# Patient Record
Sex: FEMALE | Race: WHITE | NOT HISPANIC OR LATINO | Employment: UNEMPLOYED | URBAN - METROPOLITAN AREA
[De-identification: names, ages, dates, MRNs, and addresses within clinical notes are randomized per-mention and may not be internally consistent; named-entity substitution may affect disease eponyms.]

---

## 2021-11-03 RX ORDER — ACETAMINOPHEN 325 MG/1
650 TABLET ORAL EVERY 6 HOURS PRN
Status: ACTIVE | OUTPATIENT
Start: 2021-11-03 | End: 2021-11-04

## 2021-11-03 RX ORDER — ONDANSETRON 2 MG/ML
4 INJECTION INTRAMUSCULAR; INTRAVENOUS EVERY 4 HOURS PRN
Status: ACTIVE | OUTPATIENT
Start: 2021-11-03 | End: 2021-11-04

## 2021-11-03 RX ORDER — CLONIDINE HYDROCHLORIDE 0.1 MG/1
0.1 TABLET ORAL EVERY 6 HOURS PRN
Status: ACTIVE | OUTPATIENT
Start: 2021-11-03 | End: 2021-11-04

## 2021-11-03 RX ORDER — ENALAPRILAT 1.25 MG/ML
1.25 INJECTION INTRAVENOUS EVERY 6 HOURS PRN
Status: ACTIVE | OUTPATIENT
Start: 2021-11-03 | End: 2021-11-04

## 2021-11-04 ENCOUNTER — HOSPITAL ENCOUNTER (INPATIENT)
Facility: MEDICAL CENTER | Age: 36
LOS: 3 days | DRG: 281 | End: 2021-11-07
Attending: EMERGENCY MEDICINE | Admitting: INTERNAL MEDICINE
Payer: COMMERCIAL

## 2021-11-04 ENCOUNTER — HOSPITAL ENCOUNTER (OUTPATIENT)
Facility: MEDICAL CENTER | Age: 36
End: 2021-11-04
Attending: STUDENT IN AN ORGANIZED HEALTH CARE EDUCATION/TRAINING PROGRAM | Admitting: STUDENT IN AN ORGANIZED HEALTH CARE EDUCATION/TRAINING PROGRAM
Payer: COMMERCIAL

## 2021-11-04 ENCOUNTER — APPOINTMENT (OUTPATIENT)
Dept: RADIOLOGY | Facility: MEDICAL CENTER | Age: 36
DRG: 281 | End: 2021-11-04
Attending: EMERGENCY MEDICINE
Payer: COMMERCIAL

## 2021-11-04 VITALS
HEART RATE: 86 BPM | SYSTOLIC BLOOD PRESSURE: 125 MMHG | RESPIRATION RATE: 16 BRPM | WEIGHT: 156.09 LBS | DIASTOLIC BLOOD PRESSURE: 92 MMHG | OXYGEN SATURATION: 98 % | TEMPERATURE: 98.1 F

## 2021-11-04 DIAGNOSIS — D50.9 IRON DEFICIENCY ANEMIA, UNSPECIFIED IRON DEFICIENCY ANEMIA TYPE: ICD-10-CM

## 2021-11-04 DIAGNOSIS — R07.9 CHEST PAIN, UNSPECIFIED TYPE: ICD-10-CM

## 2021-11-04 DIAGNOSIS — I10 HTN (HYPERTENSION), MALIGNANT: ICD-10-CM

## 2021-11-04 DIAGNOSIS — I21.4 NSTEMI (NON-ST ELEVATED MYOCARDIAL INFARCTION) (HCC): ICD-10-CM

## 2021-11-04 DIAGNOSIS — F10.10 ALCOHOL ABUSE: ICD-10-CM

## 2021-11-04 DIAGNOSIS — F15.10 METHAMPHETAMINE ABUSE (HCC): ICD-10-CM

## 2021-11-04 PROBLEM — I16.1 HYPERTENSIVE EMERGENCY: Status: ACTIVE | Noted: 2021-11-04

## 2021-11-04 LAB
ALBUMIN SERPL BCP-MCNC: 4.6 G/DL (ref 3.2–4.9)
ALBUMIN/GLOB SERPL: 1.3 G/DL
ALP SERPL-CCNC: 125 U/L (ref 30–99)
ALT SERPL-CCNC: 31 U/L (ref 2–50)
AMPHET UR QL SCN: POSITIVE
ANION GAP SERPL CALC-SCNC: 13 MMOL/L (ref 7–16)
ANION GAP SERPL CALC-SCNC: 14 MMOL/L (ref 7–16)
ANISOCYTOSIS BLD QL SMEAR: ABNORMAL
AST SERPL-CCNC: 118 U/L (ref 12–45)
B-HCG SERPL-ACNC: <1 MIU/ML (ref 0–5)
BARBITURATES UR QL SCN: NEGATIVE
BASOPHILS # BLD AUTO: 0 % (ref 0–1.8)
BASOPHILS # BLD AUTO: 0.2 % (ref 0–1.8)
BASOPHILS # BLD: 0 K/UL (ref 0–0.12)
BASOPHILS # BLD: 0.01 K/UL (ref 0–0.12)
BENZODIAZ UR QL SCN: NEGATIVE
BILIRUB SERPL-MCNC: 0.5 MG/DL (ref 0.1–1.5)
BUN SERPL-MCNC: 10 MG/DL (ref 8–22)
BUN SERPL-MCNC: 10 MG/DL (ref 8–22)
BZE UR QL SCN: NEGATIVE
CALCIUM SERPL-MCNC: 8.8 MG/DL (ref 8.5–10.5)
CALCIUM SERPL-MCNC: 9.8 MG/DL (ref 8.5–10.5)
CANNABINOIDS UR QL SCN: POSITIVE
CHLORIDE SERPL-SCNC: 101 MMOL/L (ref 96–112)
CHLORIDE SERPL-SCNC: 103 MMOL/L (ref 96–112)
CK SERPL-CCNC: 1499 U/L (ref 0–154)
CK SERPL-CCNC: 698 U/L (ref 0–154)
CO2 SERPL-SCNC: 19 MMOL/L (ref 20–33)
CO2 SERPL-SCNC: 23 MMOL/L (ref 20–33)
COMMENT 1642: NORMAL
CREAT SERPL-MCNC: 0.5 MG/DL (ref 0.5–1.4)
CREAT SERPL-MCNC: 0.57 MG/DL (ref 0.5–1.4)
D DIMER PPP IA.FEU-MCNC: 0.32 UG/ML (FEU) (ref 0–0.5)
EKG IMPRESSION: NORMAL
EOSINOPHIL # BLD AUTO: 0 K/UL (ref 0–0.51)
EOSINOPHIL # BLD AUTO: 0.01 K/UL (ref 0–0.51)
EOSINOPHIL NFR BLD: 0 % (ref 0–6.9)
EOSINOPHIL NFR BLD: 0.2 % (ref 0–6.9)
ERYTHROCYTE [DISTWIDTH] IN BLOOD BY AUTOMATED COUNT: 49 FL (ref 35.9–50)
ERYTHROCYTE [DISTWIDTH] IN BLOOD BY AUTOMATED COUNT: 49.1 FL (ref 35.9–50)
GLOBULIN SER CALC-MCNC: 3.6 G/DL (ref 1.9–3.5)
GLUCOSE SERPL-MCNC: 95 MG/DL (ref 65–99)
GLUCOSE SERPL-MCNC: 98 MG/DL (ref 65–99)
HCT VFR BLD AUTO: 34 % (ref 37–47)
HCT VFR BLD AUTO: 36.4 % (ref 37–47)
HGB BLD-MCNC: 10.2 G/DL (ref 12–16)
HGB BLD-MCNC: 10.9 G/DL (ref 12–16)
HYPOCHROMIA BLD QL SMEAR: ABNORMAL
IMM GRANULOCYTES # BLD AUTO: 0.02 K/UL (ref 0–0.11)
IMM GRANULOCYTES # BLD AUTO: 0.04 K/UL (ref 0–0.11)
IMM GRANULOCYTES NFR BLD AUTO: 0.4 % (ref 0–0.9)
IMM GRANULOCYTES NFR BLD AUTO: 0.7 % (ref 0–0.9)
LYMPHOCYTES # BLD AUTO: 0.57 K/UL (ref 1–4.8)
LYMPHOCYTES # BLD AUTO: 1.1 K/UL (ref 1–4.8)
LYMPHOCYTES NFR BLD: 10.1 % (ref 22–41)
LYMPHOCYTES NFR BLD: 19.5 % (ref 22–41)
MAGNESIUM SERPL-MCNC: 1.8 MG/DL (ref 1.5–2.5)
MCH RBC QN AUTO: 24.3 PG (ref 27–33)
MCH RBC QN AUTO: 24.4 PG (ref 27–33)
MCHC RBC AUTO-ENTMCNC: 29.9 G/DL (ref 33.6–35)
MCHC RBC AUTO-ENTMCNC: 30 G/DL (ref 33.6–35)
MCV RBC AUTO: 81.1 FL (ref 81.4–97.8)
MCV RBC AUTO: 81.6 FL (ref 81.4–97.8)
METHADONE UR QL SCN: NEGATIVE
MICROCYTES BLD QL SMEAR: ABNORMAL
MONOCYTES # BLD AUTO: 0.72 K/UL (ref 0–0.85)
MONOCYTES # BLD AUTO: 0.76 K/UL (ref 0–0.85)
MONOCYTES NFR BLD AUTO: 12.8 % (ref 0–13.4)
MONOCYTES NFR BLD AUTO: 13.5 % (ref 0–13.4)
MORPHOLOGY BLD-IMP: NORMAL
NEUTROPHILS # BLD AUTO: 3.77 K/UL (ref 2–7.15)
NEUTROPHILS # BLD AUTO: 4.28 K/UL (ref 2–7.15)
NEUTROPHILS NFR BLD: 66.9 % (ref 44–72)
NEUTROPHILS NFR BLD: 75.7 % (ref 44–72)
NRBC # BLD AUTO: 0 K/UL
NRBC # BLD AUTO: 0 K/UL
NRBC BLD-RTO: 0 /100 WBC
NRBC BLD-RTO: 0 /100 WBC
OPIATES UR QL SCN: NEGATIVE
OVALOCYTES BLD QL SMEAR: NORMAL
OXYCODONE UR QL SCN: NEGATIVE
PCP UR QL SCN: NEGATIVE
PLATELET # BLD AUTO: 257 K/UL (ref 164–446)
PLATELET # BLD AUTO: 264 K/UL (ref 164–446)
PLATELET BLD QL SMEAR: NORMAL
PMV BLD AUTO: 10.7 FL (ref 9–12.9)
PMV BLD AUTO: 12.6 FL (ref 9–12.9)
POIKILOCYTOSIS BLD QL SMEAR: NORMAL
POTASSIUM SERPL-SCNC: 3.4 MMOL/L (ref 3.6–5.5)
POTASSIUM SERPL-SCNC: 3.8 MMOL/L (ref 3.6–5.5)
PROPOXYPH UR QL SCN: NEGATIVE
PROT SERPL-MCNC: 8.2 G/DL (ref 6–8.2)
RBC # BLD AUTO: 4.19 M/UL (ref 4.2–5.4)
RBC # BLD AUTO: 4.46 M/UL (ref 4.2–5.4)
RBC BLD AUTO: PRESENT
SODIUM SERPL-SCNC: 136 MMOL/L (ref 135–145)
SODIUM SERPL-SCNC: 137 MMOL/L (ref 135–145)
TROPONIN T SERPL-MCNC: 1334 NG/L (ref 6–19)
TROPONIN T SERPL-MCNC: 1335 NG/L (ref 6–19)
TROPONIN T SERPL-MCNC: 1337 NG/L (ref 6–19)
TROPONIN T SERPL-MCNC: 1410 NG/L (ref 6–19)
TROPONIN T SERPL-MCNC: 1499 NG/L (ref 6–19)
TROPONIN T SERPL-MCNC: 996 NG/L (ref 6–19)
WBC # BLD AUTO: 5.6 K/UL (ref 4.8–10.8)
WBC # BLD AUTO: 5.7 K/UL (ref 4.8–10.8)

## 2021-11-04 PROCEDURE — A9270 NON-COVERED ITEM OR SERVICE: HCPCS | Performed by: INTERNAL MEDICINE

## 2021-11-04 PROCEDURE — 700102 HCHG RX REV CODE 250 W/ 637 OVERRIDE(OP): Performed by: INTERNAL MEDICINE

## 2021-11-04 PROCEDURE — 82550 ASSAY OF CK (CPK): CPT | Mod: 91

## 2021-11-04 PROCEDURE — 700105 HCHG RX REV CODE 258: Performed by: INTERNAL MEDICINE

## 2021-11-04 PROCEDURE — 83735 ASSAY OF MAGNESIUM: CPT

## 2021-11-04 PROCEDURE — 85025 COMPLETE CBC W/AUTO DIFF WBC: CPT | Mod: 91

## 2021-11-04 PROCEDURE — 99291 CRITICAL CARE FIRST HOUR: CPT

## 2021-11-04 PROCEDURE — G0378 HOSPITAL OBSERVATION PER HR: HCPCS

## 2021-11-04 PROCEDURE — 93005 ELECTROCARDIOGRAM TRACING: CPT | Performed by: EMERGENCY MEDICINE

## 2021-11-04 PROCEDURE — 99223 1ST HOSP IP/OBS HIGH 75: CPT | Performed by: STUDENT IN AN ORGANIZED HEALTH CARE EDUCATION/TRAINING PROGRAM

## 2021-11-04 PROCEDURE — 36415 COLL VENOUS BLD VENIPUNCTURE: CPT

## 2021-11-04 PROCEDURE — 93005 ELECTROCARDIOGRAM TRACING: CPT | Performed by: STUDENT IN AN ORGANIZED HEALTH CARE EDUCATION/TRAINING PROGRAM

## 2021-11-04 PROCEDURE — 85379 FIBRIN DEGRADATION QUANT: CPT

## 2021-11-04 PROCEDURE — 80061 LIPID PANEL: CPT

## 2021-11-04 PROCEDURE — 80307 DRUG TEST PRSMV CHEM ANLYZR: CPT

## 2021-11-04 PROCEDURE — 93010 ELECTROCARDIOGRAM REPORT: CPT | Performed by: INTERNAL MEDICINE

## 2021-11-04 PROCEDURE — 96374 THER/PROPH/DIAG INJ IV PUSH: CPT

## 2021-11-04 PROCEDURE — 80048 BASIC METABOLIC PNL TOTAL CA: CPT

## 2021-11-04 PROCEDURE — 700102 HCHG RX REV CODE 250 W/ 637 OVERRIDE(OP): Performed by: STUDENT IN AN ORGANIZED HEALTH CARE EDUCATION/TRAINING PROGRAM

## 2021-11-04 PROCEDURE — 96376 TX/PRO/DX INJ SAME DRUG ADON: CPT

## 2021-11-04 PROCEDURE — 700111 HCHG RX REV CODE 636 W/ 250 OVERRIDE (IP): Performed by: STUDENT IN AN ORGANIZED HEALTH CARE EDUCATION/TRAINING PROGRAM

## 2021-11-04 PROCEDURE — 71045 X-RAY EXAM CHEST 1 VIEW: CPT

## 2021-11-04 PROCEDURE — 84702 CHORIONIC GONADOTROPIN TEST: CPT

## 2021-11-04 PROCEDURE — 80053 COMPREHEN METABOLIC PANEL: CPT

## 2021-11-04 PROCEDURE — 85025 COMPLETE CBC W/AUTO DIFF WBC: CPT

## 2021-11-04 PROCEDURE — 770020 HCHG ROOM/CARE - TELE (206)

## 2021-11-04 PROCEDURE — 93010 ELECTROCARDIOGRAM REPORT: CPT | Mod: 76 | Performed by: INTERNAL MEDICINE

## 2021-11-04 PROCEDURE — 700111 HCHG RX REV CODE 636 W/ 250 OVERRIDE (IP): Performed by: EMERGENCY MEDICINE

## 2021-11-04 PROCEDURE — 80048 BASIC METABOLIC PNL TOTAL CA: CPT | Mod: 91

## 2021-11-04 PROCEDURE — 84484 ASSAY OF TROPONIN QUANT: CPT | Mod: 91

## 2021-11-04 PROCEDURE — 96372 THER/PROPH/DIAG INJ SC/IM: CPT

## 2021-11-04 PROCEDURE — 99223 1ST HOSP IP/OBS HIGH 75: CPT | Performed by: INTERNAL MEDICINE

## 2021-11-04 PROCEDURE — A9270 NON-COVERED ITEM OR SERVICE: HCPCS | Performed by: STUDENT IN AN ORGANIZED HEALTH CARE EDUCATION/TRAINING PROGRAM

## 2021-11-04 PROCEDURE — 700105 HCHG RX REV CODE 258: Performed by: STUDENT IN AN ORGANIZED HEALTH CARE EDUCATION/TRAINING PROGRAM

## 2021-11-04 PROCEDURE — 700111 HCHG RX REV CODE 636 W/ 250 OVERRIDE (IP): Performed by: INTERNAL MEDICINE

## 2021-11-04 PROCEDURE — 93005 ELECTROCARDIOGRAM TRACING: CPT

## 2021-11-04 PROCEDURE — 82550 ASSAY OF CK (CPK): CPT

## 2021-11-04 PROCEDURE — 96375 TX/PRO/DX INJ NEW DRUG ADDON: CPT

## 2021-11-04 RX ORDER — HYDROMORPHONE HYDROCHLORIDE 1 MG/ML
0.5 INJECTION, SOLUTION INTRAMUSCULAR; INTRAVENOUS; SUBCUTANEOUS
Status: DISCONTINUED | OUTPATIENT
Start: 2021-11-04 | End: 2021-11-07 | Stop reason: HOSPADM

## 2021-11-04 RX ORDER — BISACODYL 10 MG
10 SUPPOSITORY, RECTAL RECTAL
Status: DISCONTINUED | OUTPATIENT
Start: 2021-11-04 | End: 2021-11-07 | Stop reason: HOSPADM

## 2021-11-04 RX ORDER — ATORVASTATIN CALCIUM 10 MG/1
10 TABLET, FILM COATED ORAL EVERY EVENING
Status: DISCONTINUED | OUTPATIENT
Start: 2021-11-04 | End: 2021-11-06

## 2021-11-04 RX ORDER — CLONIDINE HYDROCHLORIDE 0.1 MG/1
0.1 TABLET ORAL EVERY 6 HOURS PRN
Status: DISCONTINUED | OUTPATIENT
Start: 2021-11-04 | End: 2021-11-07 | Stop reason: HOSPADM

## 2021-11-04 RX ORDER — PROCHLORPERAZINE EDISYLATE 5 MG/ML
5-10 INJECTION INTRAMUSCULAR; INTRAVENOUS EVERY 4 HOURS PRN
Status: DISCONTINUED | OUTPATIENT
Start: 2021-11-04 | End: 2021-11-07 | Stop reason: HOSPADM

## 2021-11-04 RX ORDER — ASPIRIN 300 MG/1
300 SUPPOSITORY RECTAL DAILY
Status: DISCONTINUED | OUTPATIENT
Start: 2021-11-05 | End: 2021-11-06

## 2021-11-04 RX ORDER — AMLODIPINE BESYLATE 5 MG/1
5 TABLET ORAL
Status: DISCONTINUED | OUTPATIENT
Start: 2021-11-05 | End: 2021-11-06

## 2021-11-04 RX ORDER — LORAZEPAM 2 MG/1
4 TABLET ORAL
Status: DISCONTINUED | OUTPATIENT
Start: 2021-11-04 | End: 2021-11-07 | Stop reason: HOSPADM

## 2021-11-04 RX ORDER — ATORVASTATIN CALCIUM 10 MG/1
10 TABLET, FILM COATED ORAL EVERY EVENING
Status: DISCONTINUED | OUTPATIENT
Start: 2021-11-04 | End: 2021-11-04 | Stop reason: HOSPADM

## 2021-11-04 RX ORDER — LORAZEPAM 0.5 MG/1
0.5 TABLET ORAL EVERY 4 HOURS PRN
Status: DISCONTINUED | OUTPATIENT
Start: 2021-11-04 | End: 2021-11-07 | Stop reason: HOSPADM

## 2021-11-04 RX ORDER — MORPHINE SULFATE 4 MG/ML
4 INJECTION, SOLUTION INTRAMUSCULAR; INTRAVENOUS ONCE
Status: COMPLETED | OUTPATIENT
Start: 2021-11-04 | End: 2021-11-04

## 2021-11-04 RX ORDER — LORAZEPAM 2 MG/ML
0.5 INJECTION INTRAMUSCULAR EVERY 4 HOURS PRN
Status: DISCONTINUED | OUTPATIENT
Start: 2021-11-04 | End: 2021-11-07 | Stop reason: HOSPADM

## 2021-11-04 RX ORDER — PROMETHAZINE HYDROCHLORIDE 25 MG/1
12.5-25 TABLET ORAL EVERY 4 HOURS PRN
Status: DISCONTINUED | OUTPATIENT
Start: 2021-11-04 | End: 2021-11-07 | Stop reason: HOSPADM

## 2021-11-04 RX ORDER — ASPIRIN 325 MG
325 TABLET ORAL DAILY
Status: DISCONTINUED | OUTPATIENT
Start: 2021-11-05 | End: 2021-11-06

## 2021-11-04 RX ORDER — NITROGLYCERIN 0.4 MG/1
0.4 TABLET SUBLINGUAL
Status: DISCONTINUED | OUTPATIENT
Start: 2021-11-04 | End: 2021-11-07 | Stop reason: HOSPADM

## 2021-11-04 RX ORDER — LORAZEPAM 2 MG/ML
0.5 INJECTION INTRAMUSCULAR EVERY 4 HOURS PRN
Status: DISCONTINUED | OUTPATIENT
Start: 2021-11-04 | End: 2021-11-04 | Stop reason: HOSPADM

## 2021-11-04 RX ORDER — ASPIRIN 81 MG/1
81 TABLET, CHEWABLE ORAL DAILY
Status: DISCONTINUED | OUTPATIENT
Start: 2021-11-05 | End: 2021-11-04

## 2021-11-04 RX ORDER — METOPROLOL TARTRATE 50 MG/1
50 TABLET, FILM COATED ORAL TWICE DAILY
Status: DISCONTINUED | OUTPATIENT
Start: 2021-11-04 | End: 2021-11-07 | Stop reason: HOSPADM

## 2021-11-04 RX ORDER — HEPARIN SODIUM 5000 [USP'U]/100ML
0-30 INJECTION, SOLUTION INTRAVENOUS CONTINUOUS
Status: DISCONTINUED | OUTPATIENT
Start: 2021-11-04 | End: 2021-11-04

## 2021-11-04 RX ORDER — HEPARIN SODIUM 1000 [USP'U]/ML
4000 INJECTION, SOLUTION INTRAVENOUS; SUBCUTANEOUS ONCE
Status: DISCONTINUED | OUTPATIENT
Start: 2021-11-04 | End: 2021-11-04

## 2021-11-04 RX ORDER — LABETALOL HYDROCHLORIDE 5 MG/ML
10 INJECTION, SOLUTION INTRAVENOUS EVERY 4 HOURS PRN
Status: DISCONTINUED | OUTPATIENT
Start: 2021-11-04 | End: 2021-11-07 | Stop reason: HOSPADM

## 2021-11-04 RX ORDER — ASPIRIN 81 MG/1
81 TABLET, CHEWABLE ORAL DAILY
Status: DISCONTINUED | OUTPATIENT
Start: 2021-11-04 | End: 2021-11-04 | Stop reason: HOSPADM

## 2021-11-04 RX ORDER — HEPARIN SODIUM 1000 [USP'U]/ML
2000 INJECTION, SOLUTION INTRAVENOUS; SUBCUTANEOUS PRN
Status: DISCONTINUED | OUTPATIENT
Start: 2021-11-04 | End: 2021-11-04

## 2021-11-04 RX ORDER — OXYCODONE HYDROCHLORIDE 5 MG/1
5 TABLET ORAL
Status: DISCONTINUED | OUTPATIENT
Start: 2021-11-04 | End: 2021-11-07 | Stop reason: HOSPADM

## 2021-11-04 RX ORDER — NICOTINE 21 MG/24HR
1 PATCH, TRANSDERMAL 24 HOURS TRANSDERMAL ONCE
Status: ACTIVE | OUTPATIENT
Start: 2021-11-04 | End: 2021-11-05

## 2021-11-04 RX ORDER — LORAZEPAM 2 MG/ML
2 INJECTION INTRAMUSCULAR
Status: DISCONTINUED | OUTPATIENT
Start: 2021-11-04 | End: 2021-11-04 | Stop reason: HOSPADM

## 2021-11-04 RX ORDER — ASPIRIN 81 MG/1
324 TABLET, CHEWABLE ORAL DAILY
Status: DISCONTINUED | OUTPATIENT
Start: 2021-11-05 | End: 2021-11-06

## 2021-11-04 RX ORDER — NITROGLYCERIN 0.4 MG/1
0.4 TABLET SUBLINGUAL
Status: DISCONTINUED | OUTPATIENT
Start: 2021-11-04 | End: 2021-11-04 | Stop reason: HOSPADM

## 2021-11-04 RX ORDER — LORAZEPAM 2 MG/ML
1.5 INJECTION INTRAMUSCULAR
Status: DISCONTINUED | OUTPATIENT
Start: 2021-11-04 | End: 2021-11-07 | Stop reason: HOSPADM

## 2021-11-04 RX ORDER — LORAZEPAM 1 MG/1
1 TABLET ORAL EVERY 4 HOURS PRN
Status: DISCONTINUED | OUTPATIENT
Start: 2021-11-04 | End: 2021-11-07 | Stop reason: HOSPADM

## 2021-11-04 RX ORDER — METOPROLOL TARTRATE 50 MG/1
50 TABLET, FILM COATED ORAL TWICE DAILY
Status: DISCONTINUED | OUTPATIENT
Start: 2021-11-04 | End: 2021-11-04 | Stop reason: HOSPADM

## 2021-11-04 RX ORDER — LORAZEPAM 2 MG/1
4 TABLET ORAL
Status: DISCONTINUED | OUTPATIENT
Start: 2021-11-04 | End: 2021-11-04 | Stop reason: HOSPADM

## 2021-11-04 RX ORDER — LORAZEPAM 2 MG/ML
1 INJECTION INTRAMUSCULAR
Status: DISCONTINUED | OUTPATIENT
Start: 2021-11-04 | End: 2021-11-04 | Stop reason: HOSPADM

## 2021-11-04 RX ORDER — AMLODIPINE BESYLATE 5 MG/1
5 TABLET ORAL
Status: DISCONTINUED | OUTPATIENT
Start: 2021-11-04 | End: 2021-11-04 | Stop reason: HOSPADM

## 2021-11-04 RX ORDER — PROMETHAZINE HYDROCHLORIDE 25 MG/1
12.5-25 SUPPOSITORY RECTAL EVERY 4 HOURS PRN
Status: DISCONTINUED | OUTPATIENT
Start: 2021-11-04 | End: 2021-11-07 | Stop reason: HOSPADM

## 2021-11-04 RX ORDER — LORAZEPAM 2 MG/ML
1.5 INJECTION INTRAMUSCULAR
Status: DISCONTINUED | OUTPATIENT
Start: 2021-11-04 | End: 2021-11-04 | Stop reason: HOSPADM

## 2021-11-04 RX ORDER — LORAZEPAM 2 MG/1
2 TABLET ORAL
Status: DISCONTINUED | OUTPATIENT
Start: 2021-11-04 | End: 2021-11-07 | Stop reason: HOSPADM

## 2021-11-04 RX ORDER — AMOXICILLIN 250 MG
2 CAPSULE ORAL 2 TIMES DAILY
Status: DISCONTINUED | OUTPATIENT
Start: 2021-11-05 | End: 2021-11-07 | Stop reason: HOSPADM

## 2021-11-04 RX ORDER — LORAZEPAM 2 MG/1
2 TABLET ORAL
Status: DISCONTINUED | OUTPATIENT
Start: 2021-11-04 | End: 2021-11-04 | Stop reason: HOSPADM

## 2021-11-04 RX ORDER — LORAZEPAM 2 MG/ML
2 INJECTION INTRAMUSCULAR
Status: DISCONTINUED | OUTPATIENT
Start: 2021-11-04 | End: 2021-11-07 | Stop reason: HOSPADM

## 2021-11-04 RX ORDER — SODIUM CHLORIDE 9 MG/ML
INJECTION, SOLUTION INTRAVENOUS CONTINUOUS
Status: DISCONTINUED | OUTPATIENT
Start: 2021-11-04 | End: 2021-11-04 | Stop reason: HOSPADM

## 2021-11-04 RX ORDER — ONDANSETRON 4 MG/1
4 TABLET, ORALLY DISINTEGRATING ORAL EVERY 4 HOURS PRN
Status: DISCONTINUED | OUTPATIENT
Start: 2021-11-04 | End: 2021-11-07 | Stop reason: HOSPADM

## 2021-11-04 RX ORDER — OXYCODONE HYDROCHLORIDE 10 MG/1
10 TABLET ORAL
Status: DISCONTINUED | OUTPATIENT
Start: 2021-11-04 | End: 2021-11-07 | Stop reason: HOSPADM

## 2021-11-04 RX ORDER — LORAZEPAM 2 MG/ML
1 INJECTION INTRAMUSCULAR
Status: DISCONTINUED | OUTPATIENT
Start: 2021-11-04 | End: 2021-11-07 | Stop reason: HOSPADM

## 2021-11-04 RX ORDER — LORAZEPAM 1 MG/1
1 TABLET ORAL EVERY 4 HOURS PRN
Status: DISCONTINUED | OUTPATIENT
Start: 2021-11-04 | End: 2021-11-04 | Stop reason: HOSPADM

## 2021-11-04 RX ORDER — ENALAPRILAT 1.25 MG/ML
1.25 INJECTION INTRAVENOUS EVERY 6 HOURS PRN
Status: DISCONTINUED | OUTPATIENT
Start: 2021-11-04 | End: 2021-11-07 | Stop reason: HOSPADM

## 2021-11-04 RX ORDER — LORAZEPAM 1 MG/1
1 TABLET ORAL 3 TIMES DAILY PRN
Status: ON HOLD | COMMUNITY
End: 2021-11-06

## 2021-11-04 RX ORDER — POLYETHYLENE GLYCOL 3350 17 G/17G
1 POWDER, FOR SOLUTION ORAL
Status: DISCONTINUED | OUTPATIENT
Start: 2021-11-04 | End: 2021-11-07 | Stop reason: HOSPADM

## 2021-11-04 RX ORDER — HEPARIN SODIUM 5000 [USP'U]/ML
5000 INJECTION, SOLUTION INTRAVENOUS; SUBCUTANEOUS EVERY 8 HOURS
Status: DISCONTINUED | OUTPATIENT
Start: 2021-11-04 | End: 2021-11-05

## 2021-11-04 RX ORDER — ONDANSETRON 2 MG/ML
4 INJECTION INTRAMUSCULAR; INTRAVENOUS EVERY 4 HOURS PRN
Status: DISCONTINUED | OUTPATIENT
Start: 2021-11-04 | End: 2021-11-07 | Stop reason: HOSPADM

## 2021-11-04 RX ORDER — NITROGLYCERIN 0.4 MG/1
0.4 TABLET SUBLINGUAL
Status: DISCONTINUED | OUTPATIENT
Start: 2021-11-04 | End: 2021-11-04

## 2021-11-04 RX ORDER — LORAZEPAM 0.5 MG/1
0.5 TABLET ORAL EVERY 4 HOURS PRN
Status: DISCONTINUED | OUTPATIENT
Start: 2021-11-04 | End: 2021-11-04 | Stop reason: HOSPADM

## 2021-11-04 RX ORDER — POTASSIUM CHLORIDE 20 MEQ/1
20 TABLET, EXTENDED RELEASE ORAL ONCE
Status: COMPLETED | OUTPATIENT
Start: 2021-11-04 | End: 2021-11-04

## 2021-11-04 RX ADMIN — HEPARIN SODIUM 5000 UNITS: 5000 INJECTION, SOLUTION INTRAVENOUS; SUBCUTANEOUS at 22:08

## 2021-11-04 RX ADMIN — LORAZEPAM 3 MG: 2 TABLET ORAL at 01:33

## 2021-11-04 RX ADMIN — ATORVASTATIN CALCIUM 10 MG: 10 TABLET, FILM COATED ORAL at 19:07

## 2021-11-04 RX ADMIN — MORPHINE SULFATE 4 MG: 4 INJECTION INTRAVENOUS at 18:05

## 2021-11-04 RX ADMIN — SODIUM CHLORIDE: 9 INJECTION, SOLUTION INTRAVENOUS at 06:02

## 2021-11-04 RX ADMIN — AMLODIPINE BESYLATE 5 MG: 5 TABLET ORAL at 12:28

## 2021-11-04 RX ADMIN — LORAZEPAM 1.5 MG: 2 INJECTION INTRAMUSCULAR; INTRAVENOUS at 02:55

## 2021-11-04 RX ADMIN — POTASSIUM CHLORIDE 20 MEQ: 1500 TABLET, EXTENDED RELEASE ORAL at 12:28

## 2021-11-04 RX ADMIN — HYDROMORPHONE HYDROCHLORIDE 0.5 MG: 1 INJECTION, SOLUTION INTRAMUSCULAR; INTRAVENOUS; SUBCUTANEOUS at 22:08

## 2021-11-04 RX ADMIN — ASPIRIN 81 MG: 81 TABLET, CHEWABLE ORAL at 06:03

## 2021-11-04 RX ADMIN — LORAZEPAM 0.5 MG: 2 INJECTION INTRAMUSCULAR; INTRAVENOUS at 12:29

## 2021-11-04 RX ADMIN — METOPROLOL TARTRATE 50 MG: 50 TABLET, FILM COATED ORAL at 19:07

## 2021-11-04 RX ADMIN — METOPROLOL TARTRATE 50 MG: 50 TABLET, FILM COATED ORAL at 09:24

## 2021-11-04 RX ADMIN — LORAZEPAM 1 MG: 1 TABLET ORAL at 08:17

## 2021-11-04 RX ADMIN — THERA TABS 1 TABLET: TAB at 06:03

## 2021-11-04 ASSESSMENT — COGNITIVE AND FUNCTIONAL STATUS - GENERAL
MOVING FROM LYING ON BACK TO SITTING ON SIDE OF FLAT BED: A LITTLE
MOBILITY SCORE: 23
DAILY ACTIVITIY SCORE: 23
SUGGESTED CMS G CODE MODIFIER MOBILITY: CI
HELP NEEDED FOR BATHING: A LITTLE
SUGGESTED CMS G CODE MODIFIER DAILY ACTIVITY: CI

## 2021-11-04 ASSESSMENT — LIFESTYLE VARIABLES
TACTILE DISTURBANCES: VERY MILD ITCHING, PINS AND NEEDLES SENSATION, BURNING OR NUMBNESS
AUDITORY DISTURBANCES: NOT PRESENT
TREMOR: TREMOR NOT VISIBLE BUT CAN BE FELT, FINGERTIP TO FINGERTIP
HOW MANY TIMES IN THE PAST YEAR HAVE YOU HAD 5 OR MORE DRINKS IN A DAY: 365
ANXIETY: MODERATELY ANXIOUS OR GUARDED, SO ANXIETY IS INFERRED
VISUAL DISTURBANCES: NOT PRESENT
NAUSEA AND VOMITING: NO NAUSEA AND NO VOMITING
AUDITORY DISTURBANCES: NOT PRESENT
ON A TYPICAL DAY WHEN YOU DRINK ALCOHOL HOW MANY DRINKS DO YOU HAVE: 5
HAVE PEOPLE ANNOYED YOU BY CRITICIZING YOUR DRINKING: NO
VISUAL DISTURBANCES: NOT PRESENT
HEADACHE, FULLNESS IN HEAD: MODERATELY SEVERE
TREMOR: MODERATE TREMOR WITH ARMS EXTENDED
NAUSEA AND VOMITING: *
AGITATION: MODERATELY FIDGETY AND RESTLESS
ALCOHOL_USE: YES
CONSUMPTION TOTAL: POSITIVE
AGITATION: NORMAL ACTIVITY
ANXIETY: MODERATELY ANXIOUS OR GUARDED, SO ANXIETY IS INFERRED
AVERAGE NUMBER OF DAYS PER WEEK YOU HAVE A DRINK CONTAINING ALCOHOL: 7
TOTAL SCORE: 10
HOW MANY TIMES IN THE PAST YEAR HAVE YOU HAD 5 OR MORE DRINKS IN A DAY: 365
AGITATION: NORMAL ACTIVITY
HAVE YOU EVER FELT YOU SHOULD CUT DOWN ON YOUR DRINKING: NO
VISUAL DISTURBANCES: NOT PRESENT
HAVE PEOPLE ANNOYED YOU BY CRITICIZING YOUR DRINKING: YES
TREMOR: NO TREMOR
HEADACHE, FULLNESS IN HEAD: MILD
TREMOR: TREMOR NOT VISIBLE BUT CAN BE FELT, FINGERTIP TO FINGERTIP
TOTAL SCORE: 4
ANXIETY: MODERATELY ANXIOUS OR GUARDED, SO ANXIETY IS INFERRED
EVER HAD A DRINK FIRST THING IN THE MORNING TO STEADY YOUR NERVES TO GET RID OF A HANGOVER: YES
ANXIETY: MILDLY ANXIOUS
ORIENTATION AND CLOUDING OF SENSORIUM: DATE DISORIENTATION BY MORE THAN TWO CALENDAR DAYS
TREMOR: NO TREMOR
AGITATION: SOMEWHAT MORE THAN NORMAL ACTIVITY
HEADACHE, FULLNESS IN HEAD: MODERATELY SEVERE
EVER FELT BAD OR GUILTY ABOUT YOUR DRINKING: YES
ANXIETY: NO ANXIETY (AT EASE)
AUDITORY DISTURBANCES: NOT PRESENT
AUDITORY DISTURBANCES: NOT PRESENT
ORIENTATION AND CLOUDING OF SENSORIUM: ORIENTED AND CAN DO SERIAL ADDITIONS
VISUAL DISTURBANCES: VERY MILD SENSITIVITY
PAROXYSMAL SWEATS: NO SWEAT VISIBLE
NAUSEA AND VOMITING: MILD NAUSEA WITH NO VOMITING
TOTAL SCORE: 1
EVER HAD A DRINK FIRST THING IN THE MORNING TO STEADY YOUR NERVES TO GET RID OF A HANGOVER: YES
ORIENTATION AND CLOUDING OF SENSORIUM: ORIENTED AND CAN DO SERIAL ADDITIONS
CONSUMPTION TOTAL: POSITIVE
TOTAL SCORE: 4
TOTAL SCORE: 1
TOTAL SCORE: 4
TOTAL SCORE: 18
ORIENTATION AND CLOUDING OF SENSORIUM: ORIENTED AND CAN DO SERIAL ADDITIONS
ON A TYPICAL DAY WHEN YOU DRINK ALCOHOL HOW MANY DRINKS DO YOU HAVE: 7
PAROXYSMAL SWEATS: NO SWEAT VISIBLE
ANXIETY: MILDLY ANXIOUS
TOTAL SCORE: 5
AUDITORY DISTURBANCES: NOT PRESENT
AUDITORY DISTURBANCES: NOT PRESENT
AGITATION: MODERATELY FIDGETY AND RESTLESS
HAVE YOU EVER FELT YOU SHOULD CUT DOWN ON YOUR DRINKING: YES
TREMOR: NO TREMOR
HEADACHE, FULLNESS IN HEAD: MILD
AUDITORY DISTURBANCES: NOT PRESENT
ORIENTATION AND CLOUDING OF SENSORIUM: DATE DISORIENTATION BY MORE THAN TWO CALENDAR DAYS
VISUAL DISTURBANCES: NOT PRESENT
ORIENTATION AND CLOUDING OF SENSORIUM: DATE DISORIENTATION BY MORE THAN TWO CALENDAR DAYS
TOTAL SCORE: 13
AGITATION: NORMAL ACTIVITY
NAUSEA AND VOMITING: NO NAUSEA AND NO VOMITING
VISUAL DISTURBANCES: MILD SENSITIVITY
AVERAGE NUMBER OF DAYS PER WEEK YOU HAVE A DRINK CONTAINING ALCOHOL: 7
NAUSEA AND VOMITING: MILD NAUSEA WITH NO VOMITING
AGITATION: NORMAL ACTIVITY
PAROXYSMAL SWEATS: NO SWEAT VISIBLE
ORIENTATION AND CLOUDING OF SENSORIUM: ORIENTED AND CAN DO SERIAL ADDITIONS
TOTAL SCORE: 1
HEADACHE, FULLNESS IN HEAD: MODERATE
TOTAL SCORE: 9
NAUSEA AND VOMITING: MILD NAUSEA WITH NO VOMITING
TOTAL SCORE: MILD ITCHING, PINS AND NEEDLES SENSATION, BURNING OR NUMBNESS
HEADACHE, FULLNESS IN HEAD: EXTREMELY SEVERE
NAUSEA AND VOMITING: *
ANXIETY: MILDLY ANXIOUS
TOTAL SCORE: 4
PAROXYSMAL SWEATS: NO SWEAT VISIBLE
EVER FELT BAD OR GUILTY ABOUT YOUR DRINKING: NO
DOES PATIENT WANT TO STOP DRINKING: NO
TREMOR: TREMOR NOT VISIBLE BUT CAN BE FELT, FINGERTIP TO FINGERTIP
TOTAL SCORE: MILD ITCHING, PINS AND NEEDLES SENSATION, BURNING OR NUMBNESS
HEADACHE, FULLNESS IN HEAD: NOT PRESENT
PAROXYSMAL SWEATS: NO SWEAT VISIBLE
PAROXYSMAL SWEATS: NO SWEAT VISIBLE
HEADACHE, FULLNESS IN HEAD: MODERATE
PAROXYSMAL SWEATS: NO SWEAT VISIBLE
ANXIETY: NO ANXIETY (AT EASE)
VISUAL DISTURBANCES: NOT PRESENT
NAUSEA AND VOMITING: NO NAUSEA AND NO VOMITING
ORIENTATION AND CLOUDING OF SENSORIUM: DATE DISORIENTATION BY MORE THAN TWO CALENDAR DAYS
TOTAL SCORE: 4
AUDITORY DISTURBANCES: NOT PRESENT
AGITATION: NORMAL ACTIVITY
PAROXYSMAL SWEATS: BARELY PERCEPTIBLE SWEATING. PALMS MOIST
TOTAL SCORE: 25
DO YOU DRINK ALCOHOL: YES
TREMOR: *
VISUAL DISTURBANCES: NOT PRESENT

## 2021-11-04 ASSESSMENT — PATIENT HEALTH QUESTIONNAIRE - PHQ9
1. LITTLE INTEREST OR PLEASURE IN DOING THINGS: NOT AT ALL
2. FEELING DOWN, DEPRESSED, IRRITABLE, OR HOPELESS: NOT AT ALL
SUM OF ALL RESPONSES TO PHQ9 QUESTIONS 1 AND 2: 0

## 2021-11-04 ASSESSMENT — ENCOUNTER SYMPTOMS
INSOMNIA: 0
RESPIRATORY NEGATIVE: 1
HEADACHES: 0
NAUSEA: 0
MYALGIAS: 0
HEMOPTYSIS: 0
NECK PAIN: 0
NEUROLOGICAL NEGATIVE: 1
COUGH: 0
VOMITING: 0
WEIGHT LOSS: 0
STRIDOR: 0
PALPITATIONS: 0
DOUBLE VISION: 0
FEVER: 0
BRUISES/BLEEDS EASILY: 0
EYES NEGATIVE: 1
SORE THROAT: 0
BLURRED VISION: 0
DIZZINESS: 0
DEPRESSION: 0
PSYCHIATRIC NEGATIVE: 1
MUSCULOSKELETAL NEGATIVE: 1
GASTROINTESTINAL NEGATIVE: 1

## 2021-11-04 ASSESSMENT — PAIN DESCRIPTION - PAIN TYPE
TYPE: ACUTE PAIN

## 2021-11-04 NOTE — PROGRESS NOTES
4 Eyes Skin Assessment Completed by MALU Mueller and MALU Boyle.    Head WDL  Ears WDL  Nose WDL  Mouth WDL  Neck WDL  Breast/Chest WDL  Shoulder Blades WDL  Spine WDL  (R) Arm/Elbow/Hand WDL  (L) Arm/Elbow/Hand WDL  Abdomen WDL  Groin WDL  Scrotum/Coccyx/Buttocks WDL  (R) Leg WDL  (L) Leg WDL  (R) Heel/Foot/Toe dryness/cracking  (L) Heel/Foot/Toe dryness/cracking          Devices In Places Tele Box, Pulse Ox and Nasal Cannula      Interventions In Place Gray Ear Foams and Pillows    Possible Skin Injury No    Pictures Uploaded Into Epic N/A  Wound Consult Placed N/A  RN Wound Prevention Protocol Ordered No

## 2021-11-04 NOTE — DISCHARGE SUMMARY
"Discharge Summary    CHIEF COMPLAINT ON ADMISSION  No chief complaint on file.      Reason for Admission  NSTEMI     Admission Date  11/4/2021    CODE STATUS  Full    HPI & HOSPITAL COURSE  Patient was seen at bedside and chart was reviewed. Please see Dr. Smith's note for further details.  35 yo woman with alcohol and meth use who presented [facility] with chest pain. She had UDS and alcohol level that were negative. She had increased CPK and increasing troponin and EKG with early repolarization changes. She was given aspirin and therapeutic lovenox dose and transferred.   Troponin 1337-> 1410  I consulted cardiology, this is likely meth induced demand ischemia. She was started on cardiac medications and TTE pending.   Patient wanted to leave to smoke. She refused nicotine or any other medications because \"it's not the same.\" I explained the risks of her leaving AMA which she understood, she says she'll come right back if anything happened.    Therefore, she left AMA      Discharge Date  11/4/2021      DISCHARGE DIAGNOSES  Principal Problem:    Chest pain POA: Unknown  Active Problems:    Methamphetamine abuse (HCC) POA: Unknown    Alcohol abuse POA: Unknown    Hypertensive emergency POA: Unknown    NSTEMI (non-ST elevated myocardial infarction) ([facility]) POA: Unknown  Resolved Problems:    * No resolved hospital problems. *      FOLLOW UP  No future appointments.  No follow-up provider specified.    Allergies  Allergies   Allergen Reactions   • Bee Venom Unspecified     Unknown reaction     • Hydrocodone Unspecified     Unknown reaction         DIET  Orders Placed This Encounter   Procedures   • Diet NPO Restrict to: Sips with Medications     Standing Status:   Standing     Number of Occurrences:   1     Order Specific Question:   Diet NPO Restrict to:     Answer:   Sips with Medications [3]       LABORATORY  Lab Results   Component Value Date    SODIUM 136 11/04/2021    POTASSIUM 3.4 (L) 11/04/2021    CHLORIDE 103 " 11/04/2021    CO2 19 (L) 11/04/2021    GLUCOSE 98 11/04/2021    BUN 10 11/04/2021    CREATININE 0.50 11/04/2021        Lab Results   Component Value Date    WBC 5.6 11/04/2021    HEMOGLOBIN 10.2 (L) 11/04/2021    HEMATOCRIT 34.0 (L) 11/04/2021    PLATELETCT 264 11/04/2021

## 2021-11-04 NOTE — ASSESSMENT & PLAN NOTE
Admit patient to telemetry  Troponin at outside facility 8.37 -> 12.57 (with scale with normal <0.04) , trend troponin  EKG shows J point elevation in lateral leads   DDIMER negative at outside facility   Echocardiogram in a.m.  Cardiology consult in a.m.  Given Lovenox and full dose aspirin at outside facility  Start aspirin 81 mg p.o. daily

## 2021-11-04 NOTE — PROGRESS NOTES
Pharmacy Medication Reconciliation      ~Medication reconciliation updated and complete per pt spouse at bedside & pt home pharamcy  ~No oral ABX within the last 30 days  ~Patient home pharmacy:[facility]

## 2021-11-04 NOTE — H&P
Hospital Medicine History & Physical Note    Date of Service  11/4/2021    Primary Care Physician  No primary care provider on file.    Consultants  None      Code Status  Full Code    Chief Complaint  Chest pain     History of Presenting Illness  Jerica Espinal is a 36 y.o. female who presented 11/4/2021 with chest pain nausea vomiting throbbing headache.  She presented to outside facility with the symptoms.  At outside facility, she was found to have CPK in the 150.  She was given aspirin loading dose, nitroglycerin, and therapeutic Lovenox.  She is found to have troponin of 8.37 that trended up to 12.57 with a scale of normal that is less than 0.04.  EKG shows normal sinus rhythm with J-point elevation and early requires age on the lateral leads.  Patient was transferred to [facility] for cardiac work-up.    Patient seen at bedside, appears agitated and anxious.  States that she has had chest pain that started yesterday along with shoulder pain, throbbing headache, and feeling nauseous.  States that she drinks a pint of vodka every day, last snorted methamphetamine yesterday prior to admission.  She states that she uses methamphetamine to take the edge off the alcohol.    I discussed the plan of care with patient.    Review of Systems  Review of Systems   Constitutional: Positive for malaise/fatigue.   HENT: Negative.    Eyes: Negative.    Respiratory: Negative.    Cardiovascular: Positive for chest pain.   Gastrointestinal: Negative.    Genitourinary: Negative.    Musculoskeletal: Negative.    Skin: Negative.    Neurological: Negative.    Endo/Heme/Allergies: Negative.    Psychiatric/Behavioral: Negative.        Past Medical History   has a past medical history of Pre-eclampsia added to pre-existing hypertension.    Surgical History   has a past surgical history that includes [id number].     Family History   Family history reviewed with patient. There is no family history that is pertinent to the chief complaint.      Social History   reports that she has been smoking cigarettes. She does not have any smokeless tobacco history on file. She reports current alcohol use.    Allergies  Allergies   Allergen Reactions   • Bee Venom        Medications  None       Physical Exam  Temp:  [36.8 °C (98.2 °F)] 36.8 °C (98.2 °F)  Pulse:  [82] 82  Resp:  [20] 20  BP: (163)/(95) 163/95  SpO2:  [98 %] 98 %  Blood Pressure: (!) 163/95 (RN notified)   Temperature: 36.8 °C (98.2 °F)   Pulse: 82   Respiration: 20   Pulse Oximetry: 98 %       Physical Exam  Constitutional:       Appearance: Normal appearance. She is normal weight.      Comments: Appears agitated at bedside     HENT:      Head: Normocephalic.      Nose: Nose normal.      Mouth/Throat:      Mouth: Mucous membranes are moist.      Pharynx: Oropharynx is clear.   Eyes:      Extraocular Movements: Extraocular movements intact.      Conjunctiva/sclera: Conjunctivae normal.      Pupils: Pupils are equal, round, and reactive to light.   Cardiovascular:      Rate and Rhythm: Normal rate and regular rhythm.      Pulses: Normal pulses.   Pulmonary:      Effort: Pulmonary effort is normal.      Breath sounds: Normal breath sounds.   Abdominal:      General: Abdomen is flat. Bowel sounds are normal.      Palpations: Abdomen is soft.   Neurological:      Mental Status: She is alert.         Laboratory:          No results for input(s): ALTSGPT, ASTSGOT, ALKPHOSPHAT, TBILIRUBIN, DBILIRUBIN, GAMMAGT, AMYLASE, LIPASE, ALB, PREALBUMIN, GLUCOSE in the last 72 hours.      No results for input(s): NTPROBNP in the last 72 hours.      No results for input(s): TROPONINT in the last 72 hours.    Imaging:  No orders to display       EKG:  I have personally reviewed the images and compared with prior images.    Assessment/Plan:  I anticipate this patient is appropriate for observation status at this time.    * Chest pain  Assessment & Plan  Admit patient to telemetry  Troponin at outside facility 8.37 ->  12.57 (with scale with normal <0.04) , trend troponin  EKG shows J point elevation in lateral leads   DDIMER negative at outside facility   Echocardiogram in a.m.  Cardiology consult in a.m.  Given Lovenox and full dose aspirin at outside facility  Start aspirin 81 mg p.o. daily      Alcohol abuse  Assessment & Plan  Drinks a pint of alcohol every day  Multivitamins  [facility] protocol     Methamphetamine abuse (HCC)  Assessment & Plan  Counseled on benefits of methamphetamine cessation  CIWA protocol as needed        VTE prophylaxis: therapeutic anticoagulation with lovenox

## 2021-11-04 NOTE — CONSULTS
Reason for Consult:  Asked by Dr Yajaira Renteria M.D. to see this patient with methamphetamine induced cardiac ischemia  Patient's PCP: No primary care provider on file.    CC: Chest pain    HPI:  Per chart review, patient is a 37 yo F with PMH of alcohol abuse and recent meth use who presented to [facility] with ongoing substernal chest pain, nausea, and vomiting. Patient was noted to have upward trending elevation in troponin and ST elevation in leads V3-V6. UDS positive for cannabinoids and methamphetamine. CPK noted to be elevated at 950  Patient was given full dose ASA and nitroglycerin in the ED prior to transfer to [facility].  Repeat EKG on 11/4/21 demonstrates elevated ST segments of 1mm in leads V3 and V4. She is sleepy but wakes with sternal rub, however she refuses to respond to questioning about symptoms this AM.    ROS unable to be elicited due to patient refusal to respond to questioning    Medications / Drug list prior to admission:  No current facility-administered medications on file prior to encounter.     No current outpatient medications on file prior to encounter.       Current list of administered Medications:    Current Facility-Administered Medications:   •  LORazepam (ATIVAN) tablet 0.5 mg, 0.5 mg, Oral, Q4HRS PRN, Oli Smith M.D.  •  LORazepam (ATIVAN) tablet 1 mg, 1 mg, Oral, Q4HRS PRN, 1 mg at 11/04/21 0817 **OR** LORazepam (ATIVAN) injection 0.5 mg, 0.5 mg, Intravenous, Q4HRS PRN, Oli Smith M.D.  •  LORazepam (ATIVAN) tablet 2 mg, 2 mg, Oral, Q2HRS PRN **OR** LORazepam (ATIVAN) injection 1 mg, 1 mg, Intravenous, Q2HRS PRN, Oli Smith M.D.  •  LORazepam (ATIVAN) tablet 3 mg, 3 mg, Oral, Q HOUR PRN, 3 mg at 11/04/21 0133 **OR** LORazepam (ATIVAN) injection 1.5 mg, 1.5 mg, Intravenous, Q HOUR PRN, Oli Smith M.D., 1.5 mg at 11/04/21 0255  •  LORazepam (ATIVAN) tablet 4 mg, 4 mg, Oral, Q15 MIN PRN **OR** LORazepam (ATIVAN) injection 2 mg, 2 mg, Intravenous,  Q15 MIN PRN, Oli Smith M.D.  •  multivitamin (THERAGRAN) tablet 1 Tablet, 1 Tablet, Oral, DAILY, Oli Smith M.D., 1 Tablet at 11/04/21 0603  •  aspirin (ASA) chewable tab 81 mg, 81 mg, Oral, DAILY, Oli Smith M.D., 81 mg at 11/04/21 0603  •  NS infusion, , Intravenous, Continuous, Oli Smith M.D., Last Rate: 150 mL/hr at 11/04/21 0602, New Bag at 11/04/21 0602  •  nitroglycerin (NITROSTAT) tablet 0.4 mg, 0.4 mg, Sublingual, Q5 MIN PRN, Oli Smith M.D.  •  atorvastatin (LIPITOR) tablet 10 mg, 10 mg, Oral, Q EVENING, Yajaira Renteria M.D.  •  metoprolol tartrate (LOPRESSOR) tablet 50 mg, 50 mg, Oral, TWICE DAILY, Zaira Downey M.D., 50 mg at 11/04/21 0924    Past Medical History:   Diagnosis Date   • Pre-eclampsia added to pre-existing hypertension        Past Surgical History:   Procedure Laterality Date   • [id number]         No family history on file.  Patient family history was personally reviewed, no pertinent family history to current presentation    Social History     Tobacco Use   • Smoking status: Current Every Day Smoker     Types: Cigarettes   • Tobacco comment: 4 cigarettes per day   Substance Use Topics   • Alcohol use: Yes     Comment: pint of vodka   • Drug use: Not on file       ALLERGIES:  Allergies   Allergen Reactions   • Bee Venom    • Hydrocodone          Physical exam:  Patient Vitals for the past 24 hrs:   BP Temp Temp src Pulse Resp SpO2 Weight   11/04/21 0924 148/103 -- -- 99 -- -- --   11/04/21 0830 149/104 36 °C (96.8 °F) Axillary 99 15 98 % --   11/04/21 0337 146/91 36.9 °C (98.4 °F) Temporal 96 16 97 % --   11/04/21 0113 -- -- -- -- -- -- 70.8 kg (156 lb 1.4 oz)   11/04/21 0004 (!) 163/95 36.8 °C (98.2 °F) Temporal 82 20 98 % --     General: No acute distress; sleepy  EYES: no jaundice  HEENT: OP clear   Neck:  No JVD.   CVS:  RRR. S1 + S2. No M/R/G  Resp: CTAB. No wheezing or crackles/rhonchi.  Abdomen: Soft, ND,  Skin: Grossly nothing acute no  obvious rashes  Neurological: Alert, Moves all extremities, no cranial nerve defects on limited exam  Extremities:   No edema. No cyanosis.       Data:  Laboratory studies personally reviewed by me:  Recent Results (from the past 24 hour(s))   EKG    Collection Time: 21 12:17 AM   Result Value Ref Range    Report       [facility]    Test Date:  2021  Pt Name:    PRIYANKA GRANDE                  Department: 171  MRN:        [id number]                      Room:  Gender:     Female                       Technician: THA  :        1985                   Requested By:EVE RUIZ  Order #:    [id number]                    Reading MD: Pilo Vaca MD    Measurements  Intervals                                Axis  Rate:       92                           P:          67  AR:         128                          QRS:        78  QRSD:       88                           T:          63  QT:         376  QTc:        466    Interpretive Statements  SINUS RHYTHM  PROBABLE LEFT ATRIAL ABNORMALITY  No previous ECG available for comparison  Electronically Signed On 2021 5:52:40 PDT by Pilo Vaac MD     TROPONIN    Collection Time: 21 12:26 AM   Result Value Ref Range    Troponin T 1337 (H) 6 - 19 ng/L   URINE DRUG SCREEN    Collection Time: 21 12:57 AM   Result Value Ref Range    Amphetamines Urine Positive (A) Negative    Barbiturates Negative Negative    Benzodiazepines Negative Negative    Cocaine Metabolite Negative Negative    Methadone Negative Negative    Opiates Negative Negative    Oxycodone Negative Negative    Phencyclidine -Pcp Negative Negative    Propoxyphene Negative Negative    Cannabinoid Metab Positive (A) Negative   CREATINE KINASE    Collection Time: 21  3:27 AM   Result Value Ref Range    CPK Total 1499 (H) 0 - 154 U/L   TROPONIN    Collection Time: 21  4:29 AM   Result Value Ref Range    Troponin T 1410 (H) 6 - 19 ng/L   EKG    Collection Time: 21   5:19 AM   Result Value Ref Range    Report       [facility]    Test Date:  2021  Pt Name:    PRIYANKA GRANDE                  Department: 171  MRN:        [id number]                      Room:       T708  Gender:     Female                       Technician: THA  :        1985                   Requested By:CHEYENNE LUA  Order #:    [id number]                    Reading MD: Pilo Vaca MD    Measurements  Intervals                                Axis  Rate:       93                           P:          58  MD:         124                          QRS:        72  QRSD:       84                           T:          69  QT:         364  QTc:        453    Interpretive Statements  SINUS RHYTHM  ST ELEVATION SUGGESTS PERICARDITIS  Compared to ECG 2021 00:17:47  ST (T wave) deviation now present  Electronically Signed On 2021 5:56:09 PDT by Pilo Vaca MD         Imaging:  EC-ECHOCARDIOGRAM COMPLETE W/O CONT    (Results Pending)           EKG tracings personally reviewed by me   ST elevation of 1mm in leads V3-V4.    All pertinent features of laboratory and imaging reviewed including primary images where applicable      Principal Problem:    Chest pain POA: Unknown  Active Problems:    Methamphetamine abuse (HCC) POA: Unknown    Alcohol abuse POA: Unknown  Resolved Problems:    * No resolved hospital problems. *      Assessment / Plan:    #Methamphetamine abuse  #Chest pain  #Elevated troponin  Patient's symptoms and positive UDS for  Meth consistent with meth induced vasospasm with transient cardiac ischemia. She is currently in no acute distress.  -Continue monitoring symptoms; repeat EKG for exacerbation of chest pain.  -Pending echo  -Nitro prn for anginal pain  -Continue aggressive IV hydration to increase elimination of toxin and prevent kidney injury  -Continue ASA  -Continue lipitor  -Counseled on cessation of meth use    #Tachycardia  #Hypertension  Patient's ongoing tachycardia  2/2 meth use.  -Continue ativan prn for withdrawal symptoms  -Metoprolol 50mg BID      Please refer to Dr. Downey's attestation for further recommendations    Tom Hawk DO  [employer]  PGY-2    11/4/2021

## 2021-11-04 NOTE — CARE PLAN
Problem: Knowledge Deficit - Standard  Goal: Patient and family/care givers will demonstrate understanding of plan of care, disease process/condition, diagnostic tests and medications  Outcome: Progressing     Problem: Knowledge Deficit - Standard  Goal: Patient and family/care givers will demonstrate understanding of plan of care, disease process/condition, diagnostic tests and medications  Outcome: Progressing     Problem: Knowledge Deficit - Standard  Goal: Patient and family/care givers will demonstrate understanding of plan of care, disease process/condition, diagnostic tests and medications  Outcome: Progressing   The patient is Watcher - Medium risk of patient condition declining or worsening    Shift Goals  Clinical Goals: detox  Patient Goals: pain control  Family Goals: na    Progress made toward(s) clinical / shift goals:  progressing

## 2021-11-04 NOTE — PROGRESS NOTES
MD updated regarding troponin of 1337. Per MD we will consult cards in the morning.     Per ER MALU Ramirez at [facility] Lovenox was given at [facility] at 2427 on 11/3.

## 2021-11-04 NOTE — PROGRESS NOTES
Pt awake and a/ox4 and asking to go outside to smoke. Educated pt that the hospital grounds are no smoking facility. Offered nicotine patch and gum. Pt declined. Pt visibly upset about not being able to leave the hospital floor d/t tele box. Bed alarm on.  Pt stating she would like to leave ama. Pt educated on POC and risks, but pt still wanting to leave. MD called to bedside. Reeducation given. Pt still claiming she would like to go outside to smoke and not wanting to have more of a conversation. MD to call  to update.

## 2021-11-04 NOTE — ED TRIAGE NOTES
Chief Complaint   Patient presents with   • Detox     From alcohol and last drink was yesterday. The patient reports nausea, HA, weakness, hot flashes.     Pt wheeled to triage for above complaint. The patient signed out AMA a couple hours ago from [facility] 2nd floor. Troponin was 1500 today and was diagnosed as an NSTEMI.  Pt is AO x 4, follows commands, and responds appropriately to questions. Patient's breathing is unlabored and pain is currently 10/10 on the 0-10 pain scale.  Pt placed in lobby after EKG. Patient educated on triage process and encouraged to alert staff for any changes.

## 2021-11-04 NOTE — PROGRESS NOTES
[facility] TRIAGE OFFICER DIRECT ADMISSION REPORT  Transferring facility: [facility]  Transferring physician: Dr Sánchez  Chief complaint: Chest pain, N/V  Pertinent history & patient course: 36-year-old female recently discharged from ED earlier today presents back to ED for complaints of nausea, vomiting, substernal chest pain.  Patient admits to frequent alcohol and methamphetamine use.  Per ERP pending urine drug screen and negative alcohol on admission.  D-dimer negative on work-up however initial troponin VII and repeat troponin I greater than 8.  CPK at level of 950 and patient given full dose aspirin nitroglycerin in ED.  Twelve-lead EKG reveals early repolarization versus ischemic changes.  Labs relatively unremarkable per ERP and reason for transfer is for cardiology services.  Pertinent imaging & lab results: Chest x-ray within normal limits, twelve-lead EKG uploaded under media  Code Status: Full code per transferring provider, I personally verified with the transferring provider patient's code status and the transferring provider has confirmed this with the patient.  Further work up or recommendations per triage officer prior to transfer: Lovenox prior to transfer  Consultants called prior to transfer and pertinent input from consultants: None  Patient accepted for transfer: Yes  Consultants to be called upon arrival: Cardiology in am  Admission status: Observation.   Floor requested: Observation Telemetry  If ICU transfer, name of intensivist case discussed with and pertinent input from critical care: None    Please inform the triage officer upon arrival of the patient to [facility] for assignment of a hospitalist to perform admission.     For any question or concerns regarding the care of this patient, please reach out to the assigned hospitalist.

## 2021-11-04 NOTE — PROGRESS NOTES
Patient transferred to unit from [facility], pt care assumed, VSS, pt assessment complete. Pt AAOx3, with 9/10 pain at this time. No signs of acute distress noted at this time. Plan of care discussed with pt and verbalizes no questions. Pt denies any additional needs at this time. Bed locked/in lowest position, bed alarm on, pt educated on fall risk and verbalized understanding, call light within reach, hourly rounding initiated.

## 2021-11-04 NOTE — PROGRESS NOTES
Pt left ama. MD aware. All lines removed. Tele box removed. MR notified. All belongings w/ pt. Pt educated again about risk and still wanting to leave. Security at bedside educated that she will be able to come back and would have to go through the ER if she wanted to return. Pt displays understanding and walked off unit.

## 2021-11-04 NOTE — PROGRESS NOTES
Bedside report received from nurse. Assumed care of pt. Pt resting comfortably in bed. A/Ox2 and lethargic, responds to voice and shaking, VSS,  All needs met. POC reviewed and white board updated. Tele box on. Call light in reach. Bed locked in lowest position with 2 upper bed rails up. CIWA protocol in place. Cardiology at bedside to assess.  at bedside.

## 2021-11-05 ENCOUNTER — APPOINTMENT (OUTPATIENT)
Dept: CARDIOLOGY | Facility: MEDICAL CENTER | Age: 36
DRG: 281 | End: 2021-11-05
Attending: INTERNAL MEDICINE
Payer: COMMERCIAL

## 2021-11-05 LAB
ANION GAP SERPL CALC-SCNC: 11 MMOL/L (ref 7–16)
APTT PPP: 30.2 SEC (ref 24.7–36)
BUN SERPL-MCNC: 8 MG/DL (ref 8–22)
CALCIUM SERPL-MCNC: 8.9 MG/DL (ref 8.5–10.5)
CHLORIDE SERPL-SCNC: 103 MMOL/L (ref 96–112)
CHOLEST SERPL-MCNC: 176 MG/DL (ref 100–199)
CK SERPL-CCNC: 605 U/L (ref 0–154)
CO2 SERPL-SCNC: 20 MMOL/L (ref 20–33)
CREAT SERPL-MCNC: 0.49 MG/DL (ref 0.5–1.4)
EKG IMPRESSION: NORMAL
FLUAV RNA SPEC QL NAA+PROBE: NEGATIVE
FLUBV RNA SPEC QL NAA+PROBE: NEGATIVE
GLUCOSE SERPL-MCNC: 96 MG/DL (ref 65–99)
HCG SERPL QL: NEGATIVE
HDLC SERPL-MCNC: 59 MG/DL
INR PPP: 1.12 (ref 0.87–1.13)
LDLC SERPL CALC-MCNC: 102 MG/DL
LV EJECT FRACT  99904: 60
LV EJECT FRACT MOD 2C 99903: 52.8
LV EJECT FRACT MOD 4C 99902: 48.25
LV EJECT FRACT MOD BP 99901: 49.75
POTASSIUM SERPL-SCNC: 3.8 MMOL/L (ref 3.6–5.5)
PROTHROMBIN TIME: 14.1 SEC (ref 12–14.6)
RSV RNA SPEC QL NAA+PROBE: NEGATIVE
SARS-COV-2 RNA RESP QL NAA+PROBE: NOTDETECTED
SODIUM SERPL-SCNC: 134 MMOL/L (ref 135–145)
SPECIMEN SOURCE: NORMAL
TRIGL SERPL-MCNC: 75 MG/DL (ref 0–149)
TROPONIN T SERPL-MCNC: 1320 NG/L (ref 6–19)
TROPONIN T SERPL-MCNC: 1614 NG/L (ref 6–19)
TROPONIN T SERPL-MCNC: 1629 NG/L (ref 6–19)
TROPONIN T SERPL-MCNC: 1749 NG/L (ref 6–19)
UFH PPP CHRO-ACNC: 0.28 IU/ML
UFH PPP CHRO-ACNC: <0.1 IU/ML

## 2021-11-05 PROCEDURE — 700111 HCHG RX REV CODE 636 W/ 250 OVERRIDE (IP): Performed by: INTERNAL MEDICINE

## 2021-11-05 PROCEDURE — B2111ZZ FLUOROSCOPY OF MULTIPLE CORONARY ARTERIES USING LOW OSMOLAR CONTRAST: ICD-10-PCS | Performed by: INTERNAL MEDICINE

## 2021-11-05 PROCEDURE — 99233 SBSQ HOSP IP/OBS HIGH 50: CPT | Performed by: STUDENT IN AN ORGANIZED HEALTH CARE EDUCATION/TRAINING PROGRAM

## 2021-11-05 PROCEDURE — 99233 SBSQ HOSP IP/OBS HIGH 50: CPT | Mod: 25 | Performed by: INTERNAL MEDICINE

## 2021-11-05 PROCEDURE — 93010 ELECTROCARDIOGRAM REPORT: CPT | Mod: 59 | Performed by: INTERNAL MEDICINE

## 2021-11-05 PROCEDURE — A9270 NON-COVERED ITEM OR SERVICE: HCPCS | Performed by: INTERNAL MEDICINE

## 2021-11-05 PROCEDURE — 85730 THROMBOPLASTIN TIME PARTIAL: CPT

## 2021-11-05 PROCEDURE — C9803 HOPD COVID-19 SPEC COLLECT: HCPCS | Performed by: INTERNAL MEDICINE

## 2021-11-05 PROCEDURE — 36415 COLL VENOUS BLD VENIPUNCTURE: CPT

## 2021-11-05 PROCEDURE — 96376 TX/PRO/DX INJ SAME DRUG ADON: CPT

## 2021-11-05 PROCEDURE — 84703 CHORIONIC GONADOTROPIN ASSAY: CPT

## 2021-11-05 PROCEDURE — 96375 TX/PRO/DX INJ NEW DRUG ADDON: CPT

## 2021-11-05 PROCEDURE — 700105 HCHG RX REV CODE 258: Performed by: INTERNAL MEDICINE

## 2021-11-05 PROCEDURE — 85520 HEPARIN ASSAY: CPT | Mod: 91

## 2021-11-05 PROCEDURE — 96365 THER/PROPH/DIAG IV INF INIT: CPT

## 2021-11-05 PROCEDURE — 160002 HCHG RECOVERY MINUTES (STAT)

## 2021-11-05 PROCEDURE — 93306 TTE W/DOPPLER COMPLETE: CPT

## 2021-11-05 PROCEDURE — 700111 HCHG RX REV CODE 636 W/ 250 OVERRIDE (IP): Performed by: STUDENT IN AN ORGANIZED HEALTH CARE EDUCATION/TRAINING PROGRAM

## 2021-11-05 PROCEDURE — 770020 HCHG ROOM/CARE - TELE (206)

## 2021-11-05 PROCEDURE — 93005 ELECTROCARDIOGRAM TRACING: CPT | Performed by: STUDENT IN AN ORGANIZED HEALTH CARE EDUCATION/TRAINING PROGRAM

## 2021-11-05 PROCEDURE — 96372 THER/PROPH/DIAG INJ SC/IM: CPT

## 2021-11-05 PROCEDURE — 84484 ASSAY OF TROPONIN QUANT: CPT | Mod: 91

## 2021-11-05 PROCEDURE — 0241U HCHG SARS-COV-2 COVID-19 NFCT DS RESP RNA 4 TRGT MIC: CPT

## 2021-11-05 PROCEDURE — 85610 PROTHROMBIN TIME: CPT

## 2021-11-05 PROCEDURE — 700117 HCHG RX CONTRAST REV CODE 255: Performed by: INTERNAL MEDICINE

## 2021-11-05 PROCEDURE — B2151ZZ FLUOROSCOPY OF LEFT HEART USING LOW OSMOLAR CONTRAST: ICD-10-PCS | Performed by: INTERNAL MEDICINE

## 2021-11-05 PROCEDURE — 93005 ELECTROCARDIOGRAM TRACING: CPT | Performed by: INTERNAL MEDICINE

## 2021-11-05 PROCEDURE — 93306 TTE W/DOPPLER COMPLETE: CPT | Mod: 26 | Performed by: INTERNAL MEDICINE

## 2021-11-05 PROCEDURE — 93458 L HRT ARTERY/VENTRICLE ANGIO: CPT

## 2021-11-05 PROCEDURE — 700102 HCHG RX REV CODE 250 W/ 637 OVERRIDE(OP): Performed by: INTERNAL MEDICINE

## 2021-11-05 PROCEDURE — 99152 MOD SED SAME PHYS/QHP 5/>YRS: CPT | Performed by: INTERNAL MEDICINE

## 2021-11-05 PROCEDURE — 4A023N7 MEASUREMENT OF CARDIAC SAMPLING AND PRESSURE, LEFT HEART, PERCUTANEOUS APPROACH: ICD-10-PCS | Performed by: INTERNAL MEDICINE

## 2021-11-05 PROCEDURE — 96366 THER/PROPH/DIAG IV INF ADDON: CPT

## 2021-11-05 PROCEDURE — 700101 HCHG RX REV CODE 250

## 2021-11-05 PROCEDURE — 700111 HCHG RX REV CODE 636 W/ 250 OVERRIDE (IP)

## 2021-11-05 PROCEDURE — 93458 L HRT ARTERY/VENTRICLE ANGIO: CPT | Mod: 26 | Performed by: INTERNAL MEDICINE

## 2021-11-05 RX ORDER — HEPARIN SODIUM 1000 [USP'U]/ML
4000 INJECTION, SOLUTION INTRAVENOUS; SUBCUTANEOUS ONCE
Status: COMPLETED | OUTPATIENT
Start: 2021-11-05 | End: 2021-11-05

## 2021-11-05 RX ORDER — KETOROLAC TROMETHAMINE 30 MG/ML
30 INJECTION, SOLUTION INTRAMUSCULAR; INTRAVENOUS ONCE
Status: COMPLETED | OUTPATIENT
Start: 2021-11-05 | End: 2021-11-05

## 2021-11-05 RX ORDER — VERAPAMIL HYDROCHLORIDE 2.5 MG/ML
INJECTION, SOLUTION INTRAVENOUS
Status: COMPLETED
Start: 2021-11-05 | End: 2021-11-05

## 2021-11-05 RX ORDER — HEPARIN SODIUM 200 [USP'U]/100ML
INJECTION, SOLUTION INTRAVENOUS
Status: COMPLETED
Start: 2021-11-05 | End: 2021-11-06

## 2021-11-05 RX ORDER — NITROGLYCERIN 0.4 MG/1
0.4 TABLET SUBLINGUAL
Status: DISCONTINUED | OUTPATIENT
Start: 2021-11-05 | End: 2021-11-05

## 2021-11-05 RX ORDER — MORPHINE SULFATE 4 MG/ML
4 INJECTION, SOLUTION INTRAMUSCULAR; INTRAVENOUS ONCE
Status: COMPLETED | OUTPATIENT
Start: 2021-11-05 | End: 2021-11-05

## 2021-11-05 RX ORDER — HEPARIN SODIUM 1000 [USP'U]/ML
INJECTION, SOLUTION INTRAVENOUS; SUBCUTANEOUS
Status: COMPLETED
Start: 2021-11-05 | End: 2021-11-05

## 2021-11-05 RX ORDER — SODIUM CHLORIDE 9 MG/ML
INJECTION, SOLUTION INTRAVENOUS CONTINUOUS
Status: DISCONTINUED | OUTPATIENT
Start: 2021-11-05 | End: 2021-11-06

## 2021-11-05 RX ORDER — SODIUM CHLORIDE 9 MG/ML
INJECTION, SOLUTION INTRAVENOUS CONTINUOUS
Status: ACTIVE | OUTPATIENT
Start: 2021-11-05 | End: 2021-11-05

## 2021-11-05 RX ORDER — MIDAZOLAM HYDROCHLORIDE 1 MG/ML
INJECTION INTRAMUSCULAR; INTRAVENOUS
Status: COMPLETED
Start: 2021-11-05 | End: 2021-11-05

## 2021-11-05 RX ORDER — LIDOCAINE HYDROCHLORIDE 20 MG/ML
INJECTION, SOLUTION INFILTRATION; PERINEURAL
Status: COMPLETED
Start: 2021-11-05 | End: 2021-11-05

## 2021-11-05 RX ORDER — HEPARIN SODIUM 1000 [USP'U]/ML
2000 INJECTION, SOLUTION INTRAVENOUS; SUBCUTANEOUS PRN
Status: DISCONTINUED | OUTPATIENT
Start: 2021-11-05 | End: 2021-11-06

## 2021-11-05 RX ORDER — HEPARIN SODIUM 5000 [USP'U]/100ML
0-30 INJECTION, SOLUTION INTRAVENOUS CONTINUOUS
Status: DISCONTINUED | OUTPATIENT
Start: 2021-11-05 | End: 2021-11-06

## 2021-11-05 RX ADMIN — FENTANYL CITRATE 50 MCG: 50 INJECTION, SOLUTION INTRAMUSCULAR; INTRAVENOUS at 15:19

## 2021-11-05 RX ADMIN — HYDROMORPHONE HYDROCHLORIDE 0.5 MG: 1 INJECTION, SOLUTION INTRAMUSCULAR; INTRAVENOUS; SUBCUTANEOUS at 03:13

## 2021-11-05 RX ADMIN — OXYCODONE 5 MG: 5 TABLET ORAL at 21:23

## 2021-11-05 RX ADMIN — IOHEXOL 25 ML: 350 INJECTION, SOLUTION INTRAVENOUS at 14:45

## 2021-11-05 RX ADMIN — ASPIRIN 325 MG ORAL TABLET 325 MG: 325 PILL ORAL at 06:32

## 2021-11-05 RX ADMIN — NITROGLYCERIN 0.4 MG: 0.4 TABLET, ORALLY DISINTEGRATING SUBLINGUAL at 04:04

## 2021-11-05 RX ADMIN — LIDOCAINE HYDROCHLORIDE: 20 INJECTION, SOLUTION INFILTRATION; PERINEURAL at 15:01

## 2021-11-05 RX ADMIN — HEPARIN SODIUM 2000 UNITS: 1000 INJECTION INTRAVENOUS; SUBCUTANEOUS at 20:21

## 2021-11-05 RX ADMIN — HEPARIN SODIUM 4000 UNITS: 1000 INJECTION, SOLUTION INTRAVENOUS; SUBCUTANEOUS at 12:38

## 2021-11-05 RX ADMIN — MIDAZOLAM HYDROCHLORIDE 1.5 MG: 1 INJECTION, SOLUTION INTRAMUSCULAR; INTRAVENOUS at 15:18

## 2021-11-05 RX ADMIN — NITROGLYCERIN 0.4 MG: 0.4 TABLET, ORALLY DISINTEGRATING SUBLINGUAL at 12:27

## 2021-11-05 RX ADMIN — HEPARIN SODIUM 12 UNITS/KG/HR: 5000 INJECTION, SOLUTION INTRAVENOUS at 12:39

## 2021-11-05 RX ADMIN — LORAZEPAM 1 MG: 1 TABLET ORAL at 21:23

## 2021-11-05 RX ADMIN — NITROGLYCERIN 0.4 MG: 0.4 TABLET, ORALLY DISINTEGRATING SUBLINGUAL at 06:30

## 2021-11-05 RX ADMIN — SODIUM CHLORIDE: 9 INJECTION, SOLUTION INTRAVENOUS at 13:13

## 2021-11-05 RX ADMIN — MORPHINE SULFATE 4 MG: 4 INJECTION INTRAVENOUS at 13:13

## 2021-11-05 RX ADMIN — HEPARIN SODIUM 5000 UNITS: 5000 INJECTION, SOLUTION INTRAVENOUS; SUBCUTANEOUS at 06:33

## 2021-11-05 RX ADMIN — HEPARIN SODIUM: 200 INJECTION, SOLUTION INTRAVENOUS at 14:30

## 2021-11-05 RX ADMIN — THERA TABS 1 TABLET: TAB at 06:32

## 2021-11-05 RX ADMIN — SODIUM CHLORIDE: 9 INJECTION, SOLUTION INTRAVENOUS at 04:45

## 2021-11-05 RX ADMIN — KETOROLAC TROMETHAMINE 30 MG: 30 INJECTION, SOLUTION INTRAMUSCULAR at 18:52

## 2021-11-05 RX ADMIN — HYDROMORPHONE HYDROCHLORIDE 0.5 MG: 1 INJECTION, SOLUTION INTRAMUSCULAR; INTRAVENOUS; SUBCUTANEOUS at 02:44

## 2021-11-05 RX ADMIN — NITROGLYCERIN 10 ML: 20 INJECTION INTRAVENOUS at 15:01

## 2021-11-05 RX ADMIN — HEPARIN SODIUM: 1000 INJECTION, SOLUTION INTRAVENOUS; SUBCUTANEOUS at 15:01

## 2021-11-05 RX ADMIN — VERAPAMIL HYDROCHLORIDE 2.5 MG: 2.5 INJECTION, SOLUTION INTRAVENOUS at 15:01

## 2021-11-05 ASSESSMENT — LIFESTYLE VARIABLES
VISUAL DISTURBANCES: NOT PRESENT
TREMOR: *
PAROXYSMAL SWEATS: NO SWEAT VISIBLE
HEADACHE, FULLNESS IN HEAD: MILD
AUDITORY DISTURBANCES: NOT PRESENT
NAUSEA AND VOMITING: NO NAUSEA AND NO VOMITING
AGITATION: NORMAL ACTIVITY
TREMOR: NO TREMOR
VISUAL DISTURBANCES: NOT PRESENT
TOTAL SCORE: 0
ORIENTATION AND CLOUDING OF SENSORIUM: ORIENTED AND CAN DO SERIAL ADDITIONS
ANXIETY: NO ANXIETY (AT EASE)
ORIENTATION AND CLOUDING OF SENSORIUM: ORIENTED AND CAN DO SERIAL ADDITIONS
PAROXYSMAL SWEATS: NO SWEAT VISIBLE
NAUSEA AND VOMITING: NO NAUSEA AND NO VOMITING
TOTAL SCORE: 10
HEADACHE, FULLNESS IN HEAD: NOT PRESENT
ANXIETY: *
AGITATION: *
AUDITORY DISTURBANCES: NOT PRESENT

## 2021-11-05 ASSESSMENT — ENCOUNTER SYMPTOMS
DIARRHEA: 0
TREMORS: 0
HEADACHES: 0
FEVER: 0
HEMOPTYSIS: 0
NAUSEA: 1
FLANK PAIN: 0
WHEEZING: 0
CHEST TIGHTNESS: 0
CHILLS: 0
COUGH: 0
ABDOMINAL PAIN: 0
DIZZINESS: 0
VOMITING: 0
NERVOUS/ANXIOUS: 1
DOUBLE VISION: 0
BLURRED VISION: 0
NECK PAIN: 0
BACK PAIN: 0
BLOOD IN STOOL: 0
SENSORY CHANGE: 0
EYE PAIN: 0
LOSS OF CONSCIOUSNESS: 0
SHORTNESS OF BREATH: 1
PALPITATIONS: 0

## 2021-11-05 ASSESSMENT — PAIN DESCRIPTION - PAIN TYPE
TYPE: ACUTE PAIN

## 2021-11-05 ASSESSMENT — FIBROSIS 4 INDEX: FIB4 SCORE: 3.13

## 2021-11-05 NOTE — ED NOTES
Pt awoke complaining of 10/10 chest pain- medicated with dilaudid per MAR. Call bell within reach- VSS on RA

## 2021-11-05 NOTE — ED NOTES
Patient c/o chest pain so nitro given. Heparin gtt started. MD Faulkner with cardiology was at bedside and does not want any other meds at this time. Plan for cardiac cath

## 2021-11-05 NOTE — ED NOTES
Pt reports some relief in pain after nitroglycerin. Pain 7/10. Pt states she is going to try to go back to sleep

## 2021-11-05 NOTE — ED NOTES
Pt awoke complaining of 8/10 chest pain. Pt states she cannot take oxycodone or hydrocodone due to itchiness. Given hydromorphone per MAR. Pt falls back to sleep quickly

## 2021-11-05 NOTE — ED NOTES
Pt sleeping in room, easy, equal chest rise and fall. Pt remains calm and cooperative. Informed Pt that her mother called and wanted Pt to call her back.

## 2021-11-05 NOTE — ED NOTES
Fresh blood from new IV start sent to lab for repeat troponin. Per lab, troponin was ran on old blood from 2317. New troponin order placed for lab to run troponin on fresh blood from 0430.

## 2021-11-05 NOTE — ED NOTES
Pt continues to complain of chest pain 10/10 after two doses of dilaudid. MD White texted via Vo[employer]. Repeat EKG performed. Pt continues to ask when she can eat, RN reiterated plan of care- receiving echo in AM. Pt pulled off O2. Spo2 91-92% on RA now

## 2021-11-05 NOTE — ED NOTES
Given additional dose of hydromorphone per MAR order for persistant L sided burning chest pain. Pain 10/10 remains after first dose of dilaudid. Will reassess in 10 minutes

## 2021-11-05 NOTE — ED NOTES
Report given to MALU Velasco  Pt sleeping in room, easy, equal chest rise and fall. Pt remains calm and cooperative.

## 2021-11-05 NOTE — PROGRESS NOTES
Hospital Medicine Daily Progress Note    Date of Service  11/5/2021    Chief Complaint  Jerica Espinal is a 36 y.o. female admitted 11/4/2021 with nausea, HA    Hospital Course  Jerica Espinal is a 36 y.o. female with history of alcohol and methamphetamine abuse who presented 11/4/2021 as transfer from [place] for methamphetamine induced non-ST elevation MI.  Patient left AMA yesterday morning but returned after a fall without injury.  Patient admits homeless. She reported chest pain, sob and nausea.   Labs reveal troponin 1300, no acute ischemic ST changes on EKG.  Cardiology Dr Downey was consulted twice yesterday before and after AMA. Per Dr Downey, it was considered meth induced vasospasm and demand ischemia. No procedure or heparin needed per Dr. Downey.     I saw the patient this morning. Patient still reported left sided chest pain 10/10 in intensity, more constant pain now, nitro doesn't help much. Trop 1629, PANDA showed apical wall motion abnormality with preserved EF. Concerned about NSTEMI.  Dr. Hurtado was consulted,  Plan for heart cath today.       Interval Problem Update  Patient reported constant severe left sided chest pain 10/10 intensity, associated with nausea and mild shortness of breath.  Nitro only provided minimal relief.  Tachycardia, tachypnea.  On room air    Abnormal PANDA with abnormal apical wall motion.    Trop 1300-> 1700-> 1600    Reconsulted cardiology this morning, plan for cath today per Dr. Hurtado.  Start  heparin drip      I have personally seen and examined the patient at bedside. I discussed the plan of care with patient and bedside RN.    Consultants/Specialty  cardiology    Code Status  Full Code    Disposition  Patient is not medically cleared.   Anticipate discharge to to home with close outpatient follow-up.  I have placed the appropriate orders for post-discharge needs.    Review of Systems  Review of Systems   Constitutional: Positive for malaise/fatigue. Negative for chills  and fever.   HENT: Negative for ear discharge and ear pain.    Eyes: Negative for blurred vision, double vision and pain.   Respiratory: Positive for shortness of breath. Negative for cough, hemoptysis and wheezing.    Cardiovascular: Positive for chest pain. Negative for palpitations.   Gastrointestinal: Positive for nausea. Negative for abdominal pain, blood in stool, diarrhea and vomiting.   Genitourinary: Negative for dysuria, flank pain, hematuria and urgency.   Musculoskeletal: Negative for back pain and neck pain.   Neurological: Negative for tremors, sensory change, loss of consciousness and headaches.   Psychiatric/Behavioral: The patient is nervous/anxious.         Physical Exam  Temp:  [36.9 °C (98.4 °F)] 36.9 °C (98.4 °F)  Pulse:  [] 85  Resp:  [12-63] 15  BP: ()/(55-84) 117/68  SpO2:  [90 %-99 %] 95 %    Physical Exam  Constitutional:       General: She is not in acute distress.     Appearance: Normal appearance. She is not ill-appearing.   HENT:      Head: Normocephalic and atraumatic.      Right Ear: External ear normal.      Left Ear: External ear normal.      Nose: Nose normal. No congestion or rhinorrhea.      Mouth/Throat:      Mouth: Mucous membranes are moist.   Eyes:      Extraocular Movements: Extraocular movements intact.      Conjunctiva/sclera: Conjunctivae normal.      Pupils: Pupils are equal, round, and reactive to light.   Cardiovascular:      Rate and Rhythm: Regular rhythm. Tachycardia present.      Pulses: Normal pulses.      Heart sounds: Normal heart sounds.   Pulmonary:      Effort: Pulmonary effort is normal. No respiratory distress.      Breath sounds: Normal breath sounds. No stridor. No wheezing, rhonchi or rales.   Chest:      Chest wall: Tenderness present.   Abdominal:      General: There is no distension.      Palpations: Abdomen is soft.      Tenderness: There is no abdominal tenderness. There is no guarding or rebound.   Musculoskeletal:         General: No  swelling or tenderness. Normal range of motion.      Cervical back: Normal range of motion and neck supple.   Skin:     General: Skin is warm and dry.   Neurological:      General: No focal deficit present.      Mental Status: She is alert and oriented to person, place, and time.      Sensory: No sensory deficit.      Motor: No weakness.      Coordination: Coordination normal.   Psychiatric:      Comments: Anxious         Fluids    Intake/Output Summary (Last 24 hours) at 11/5/2021 1440  Last data filed at 11/5/2021 1251  Gross per 24 hour   Intake 500 ml   Output --   Net 500 ml       Laboratory  Recent Labs     11/04/21  0026 11/04/21  1644   WBC 5.6 5.7   RBC 4.19* 4.46   HEMOGLOBIN 10.2* 10.9*   HEMATOCRIT 34.0* 36.4*   MCV 81.1* 81.6   MCH 24.3* 24.4*   MCHC 30.0* 29.9*   RDW 49.0 49.1   PLATELETCT 264 257   MPV 12.6 10.7     Recent Labs     11/04/21  0429 11/04/21  1644 11/04/21  2251   SODIUM 136 137 134*   POTASSIUM 3.4* 3.8 3.8   CHLORIDE 103 101 103   CO2 19* 23 20   GLUCOSE 98 95 96   BUN 10 10 8   CREATININE 0.50 0.57 0.49*   CALCIUM 8.8 9.8 8.9     Recent Labs     11/05/21  1234   APTT 30.2   INR 1.12         Recent Labs     11/04/21  2251   TRIGLYCERIDE 75   HDL 59   *       Imaging  EC-ECHOCARDIOGRAM COMPLETE W/O CONT   Final Result      DX-CHEST-PORTABLE (1 VIEW)   Final Result      1.  No acute cardiopulmonary process is seen.   2.  Probable exostosis arising from the anterior third rib on the right near the costochondral junction. Correlation with prior imaging would be of value. Further delineation can be performed with CT.      CL-LEFT HEART CATHETERIZATION WITH POSSIBLE INTERVENTION    (Results Pending)        Assessment/Plan  NSTEMI (non-ST elevated myocardial infarction) (HCC)- (present on admission)  Assessment & Plan  Troponin was up to 1700  PANDA showed apical wall motion abnormality with preserved EF.     Dr. Hurtado was consulted,    Plan for heart cath today.   Start a heparin  drip    Hypertensive emergency- (present on admission)  Assessment & Plan  Amlodipine, nitroglycerin, metoprolol, hydralazine as needed    Alcohol abuse- (present on admission)  Assessment & Plan  CIWA protocol    Methamphetamine abuse (HCC)- (present on admission)  Assessment & Plan  Education, supportive care, benzodiazepine as needed       VTE prophylaxis: SCDs/TEDs and heparin ppx    I have performed a physical exam and reviewed and updated ROS and Plan today (11/5/2021). In review of yesterday's note (11/4/2021), there are no changes except as documented above.

## 2021-11-05 NOTE — ED NOTES
"Pt continues to complain of 8/10 L sided chest pain that now radiates into L shoulder. Given dose of nitro, no relief. Pt states pain is \"killing me.\" Visibly uncomfortable when awake but falls asleep quickly. Repeat troponin and EKG performed per verbal order. MD Molina messaged.   "

## 2021-11-05 NOTE — PROGRESS NOTES
I was called to discuss this patients care with Dr. Early. Of note, we also saw this patient as a consult earlier in the day and then she left AMA. She is now back in ER. Her Urine tox screen is again positive for methamphetamine. We discussed this case in details. I personally reviewed all blood tests, studies. No evidence of ACS. No indication for further invasive cardiac work up. No indication for heparin gtt. Elevated troponin T was likely related to demand ischemia in setting of HTN emergency and methamphetamine abuse. I will also advise to stop checking troponin on this patient.    At this time it was deemed no formal in person cardiology consultation was necessary, however if this changes due to changes in patient condition or abnormal test results, please re-consult cardiology.    Electronically Signed by:    Zaira Downey M.D.      11/4/2021    9:27 PM

## 2021-11-05 NOTE — ED PROVIDER NOTES
ED Provider Note    CHIEF COMPLAINT  Chief Complaint   Patient presents with   • Detox     From alcohol and last drink was yesterday. The patient reports nausea, HA, weakness, hot flashes.       HPI  Jerica Espinal is a 36 y.o. female who presents with ongoing chest pain.  Patient was seen earlier today at [facility].  She did use methamphetamine via snorting it earlier and developed chest pain shortly thereafter.  Patient reports chest pain is central and pressure-like.  She reports it was constant earlier today but is now waxing and waning without any identifiable provoking or relieving factors.  She denies any associated lower extremity edema or calf pain.  At time of first hospital patient had a troponin which was significantly elevated, this continue to increase and therefore patient was transferred to our facility for further care.  Patient D-dimer at 12/4 was negative.  Patient was admitted as a direct admit, cardiology was consulted, heparin was discouraged as it was thought that this was secondary to vasospasm rather than plaque related occlusive disease.  Patient left AGAINST MEDICAL ADVICE to smoke some black in mild cigarettes.  She returns for care.    REVIEW OF SYSTEMS  ROS    See HPI for further details. All other systems are negative.     PAST MEDICAL HISTORY   has a past medical history of Hypertensive emergency (11/4/2021), NSTEMI (non-ST elevated myocardial infarction) (HCC) (11/4/2021), and Pre-eclampsia added to pre-existing hypertension.    SOCIAL HISTORY  Social History     Tobacco Use   • Smoking status: Current Every Day Smoker     Types: Cigarettes   • Smokeless tobacco: Never Used   • Tobacco comment: 4 cigarettes per day   Vaping Use   • Vaping Use: Never used   Substance and Sexual Activity   • Alcohol use: Yes     Comment: pint of vodka per day   • Drug use: Not Currently   • Sexual activity: Not on file       SURGICAL HISTORY   has a past surgical history that includes  [id number].    CURRENT MEDICATIONS  Home Medications     Reviewed by Marleny Martinez (Pharmacy Tech) on 11/04/21 at 1726  Med List Status: Complete   Medication Last Dose Status   LORazepam (ATIVAN) 1 MG Tab PRN Active                ALLERGIES  Allergies   Allergen Reactions   • Bee Venom Unspecified     Unknown reaction     • Hydrocodone Unspecified     Unknown reaction         PHYSICAL EXAM  Vitals:    11/04/21 1512   BP: 121/84   Pulse: 95   Resp: 18   Temp: 36.9 °C (98.4 °F)   SpO2: 98%       Physical Exam  Constitutional:       Appearance: She is well-developed.   HENT:      Head: Normocephalic and atraumatic.   Eyes:      Conjunctiva/sclera: Conjunctivae normal.   Cardiovascular:      Rate and Rhythm: Normal rate and regular rhythm.   Pulmonary:      Effort: Pulmonary effort is normal.      Breath sounds: Normal breath sounds.   Abdominal:      General: Bowel sounds are normal. There is no distension.      Palpations: Abdomen is soft.      Tenderness: There is no abdominal tenderness. There is no rebound.   Musculoskeletal:      Cervical back: Normal range of motion and neck supple.   Skin:     General: Skin is warm and dry.      Findings: No rash.   Neurological:      Mental Status: She is alert and oriented to person, place, and time.   Psychiatric:         Behavior: Behavior normal.       31 minutes of critical care were spent with this patient    DIAGNOSTIC STUDIES / PROCEDURES    EKG  See below    LABS  Results for orders placed or performed during the hospital encounter of 11/04/21   CBC with Differential   Result Value Ref Range    WBC 5.7 4.8 - 10.8 K/uL    RBC 4.46 4.20 - 5.40 M/uL    Hemoglobin 10.9 (L) 12.0 - 16.0 g/dL    Hematocrit 36.4 (L) 37.0 - 47.0 %    MCV 81.6 81.4 - 97.8 fL    MCH 24.4 (L) 27.0 - 33.0 pg    MCHC 29.9 (L) 33.6 - 35.0 g/dL    RDW 49.1 35.9 - 50.0 fL    Platelet Count 257 164 - 446 K/uL    MPV 10.7 9.0 - 12.9 fL    Neutrophils-Polys 75.70 (H) 44.00 - 72.00 %    Lymphocytes  10.10 (L) 22.00 - 41.00 %    Monocytes 13.50 (H) 0.00 - 13.40 %    Eosinophils 0.00 0.00 - 6.90 %    Basophils 0.00 0.00 - 1.80 %    Immature Granulocytes 0.70 0.00 - 0.90 %    Nucleated RBC 0.00 /100 WBC    Neutrophils (Absolute) 4.28 2.00 - 7.15 K/uL    Lymphs (Absolute) 0.57 (L) 1.00 - 4.80 K/uL    Monos (Absolute) 0.76 0.00 - 0.85 K/uL    Eos (Absolute) 0.00 0.00 - 0.51 K/uL    Baso (Absolute) 0.00 0.00 - 0.12 K/uL    Immature Granulocytes (abs) 0.04 0.00 - 0.11 K/uL    NRBC (Absolute) 0.00 K/uL    Hypochromia 1+     Anisocytosis 1+     Microcytosis 1+    Complete Metabolic Panel (CMP)   Result Value Ref Range    Sodium 137 135 - 145 mmol/L    Potassium 3.8 3.6 - 5.5 mmol/L    Chloride 101 96 - 112 mmol/L    Co2 23 20 - 33 mmol/L    Anion Gap 13.0 7.0 - 16.0    Glucose 95 65 - 99 mg/dL    Bun 10 8 - 22 mg/dL    Creatinine 0.57 0.50 - 1.40 mg/dL    Calcium 9.8 8.5 - 10.5 mg/dL    AST(SGOT) 118 (H) 12 - 45 U/L    ALT(SGPT) 31 2 - 50 U/L    Alkaline Phosphatase 125 (H) 30 - 99 U/L    Total Bilirubin 0.5 0.1 - 1.5 mg/dL    Albumin 4.6 3.2 - 4.9 g/dL    Total Protein 8.2 6.0 - 8.2 g/dL    Globulin 3.6 (H) 1.9 - 3.5 g/dL    A-G Ratio 1.3 g/dL   Troponin   Result Value Ref Range    Troponin T 1335 (H) 6 - 19 ng/L   PERIPHERAL SMEAR REVIEW   Result Value Ref Range    Peripheral Smear Review see below    PLATELET ESTIMATE   Result Value Ref Range    Plt Estimation Normal    MORPHOLOGY   Result Value Ref Range    RBC Morphology Present     Poikilocytosis 1+     Ovalocytes 1+    DIFFERENTIAL COMMENT   Result Value Ref Range    Comments-Diff see below    ESTIMATED GFR   Result Value Ref Range    GFR If African American >60 >60 mL/min/1.73 m 2    GFR If Non African American >60 >60 mL/min/1.73 m 2   Troponin in four (4) hours   Result Value Ref Range    Troponin T 996 (H) 6 - 19 ng/L   Magnesium   Result Value Ref Range    Magnesium 1.8 1.5 - 2.5 mg/dL   D-Dimer   Result Value Ref Range    D-Dimer Screen 0.32 0.00 - 0.50  ug/mL (FEU)   CREATINE KINASE   Result Value Ref Range    CPK Total 698 (H) 0 - 154 U/L   EKG   Result Value Ref Range    Report       [facility] Emergency Dept.    Test Date:  2021  Pt Name:    PRIYANKA GRANDE                  Department: ER  MRN:        [id number]                      Room:  Gender:     Female                       Technician: [id number]  :        [id number]                   Requested By:ER TRIAGE PROTOCOL  Order #:    [id number]                    Reading MD: Olivia Moseley MD    Measurements  Intervals                                Axis  Rate:       87                           P:          60  PA:         124                          QRS:        53  QRSD:       88                           T:          84  QT:         400  QTc:        482    Interpretive Statements  EKG is normal sinus rhythm with ST elevation in the lateral leads without  overt  reciprocal changes  Electronically Signed On 2021 17:14:02 PDT by Olivia Moseley MD           RADIOLOGY  DX-CHEST-PORTABLE (1 VIEW)   Final Result      1.  No acute cardiopulmonary process is seen.   2.  Probable exostosis arising from the anterior third rib on the right near the costochondral junction. Correlation with prior imaging would be of value. Further delineation can be performed with CT.      EC-ECHOCARDIOGRAM COMPLETE W/O CONT    (Results Pending)           COURSE & MEDICAL DECISION MAKING  Pertinent Labs & Imaging studies reviewed. (See chart for details)    Patient here with ongoing chest pain, known elevated troponin.  Patient symptoms likely secondary to sympathomimetic related vasospasm.  Patient reports that she will stay here in the hospital now.  Patient case discussed with cardiology does not have any further recommendations.  He does not believe the patient should be started on a heparin drip and does not recommend any further management from a cardiology standpoint at this point.  Patient given some morphine  for her pain.  Patient is comfortable.  Patient's basic labs are reassuring.  Her troponin is actually downtrending.  I discussed the case with hospitalist who has agreed to admit.    The patient will return for worsening symptoms and is stable at the time of discharge. The patient verbalizes understanding and will comply.    FINAL IMPRESSION    1. HTN (hypertension), malignant    2. Methamphetamine abuse (HCC)    3. Chest pain, unspecified type    4. NSTEMI (non-ST elevated myocardial infarction) ([facility])               Electronically signed by: Pradip Baker M.D., 11/4/2021 5:41 PM

## 2021-11-05 NOTE — CONSULTS
Cardiology Initial Consultation    Date of Service  11/5/2021    Referring Physician  Belinda Mock M.D.    Reason for Consultation  Chest pain elevated troponin level    History of Presenting Illness  Jerica Espinal is a 36 y.o. female with a past medical history of polysubstance abuse including chronic active methamphetamine use, alcohol abuse and chronic tobacco use who presented 11/4/2021 with 2-day history of sharp anterior chest pain associated with shortness of breath and nausea and vomiting with no hematemesis.    Recently came to the ER then left AMA returning because of ongoing severe chest pain.  Troponin levels have consistently been elevated increasing from 996 up to 1749.  Denies prior history of heart disease.  Denies hypertension, diabetes mellitus or dyslipidemia.  Last methamphetamine use and alcohol use 48 hours ago.  States she received 1 Pfizer vaccine in [place] 6 to 9 months ago.  States both parents are alive but have heart disease.  Menstruates generally heavily but not currently menstruating.  Does not believe she is pregnant.    Social history smokes a pack of cigars a day and drinks a pint of hard liquor a day.    Review of Systems  Review of Systems   Respiratory: Positive for shortness of breath. Negative for chest tightness.    Cardiovascular: Positive for chest pain. Negative for palpitations.   Gastrointestinal: Positive for nausea. Negative for abdominal pain.   Neurological: Negative for dizziness and headaches.       Past Medical History   has a past medical history of Hypertensive emergency (11/4/2021), NSTEMI (non-ST elevated myocardial infarction) (HCC) (11/4/2021), and Pre-eclampsia added to pre-existing hypertension.    Surgical History   has a past surgical history that includes [id number].    Family History  Both parents have heart disease    Social History   reports that she has been smoking cigarettes. She has never used smokeless tobacco. She reports current alcohol use. She  reports previous drug use.    Medications  Prior to Admission Medications   Prescriptions Last Dose Informant Patient Reported? Taking?   LORazepam (ATIVAN) 1 MG Tab PRN at PRN Patient's Home Pharmacy Yes No   Sig: Take 1 mg by mouth 3 times a day as needed (Withdrawl symptoms).      Facility-Administered Medications: None       Allergies  Allergies   Allergen Reactions   • Bee Venom Unspecified     Unknown reaction     • Hydrocodone Unspecified     Unknown reaction         Vital signs in last 24 hours  Temp:  [36.9 °C (98.4 °F)] 36.9 °C (98.4 °F)  Pulse:  [] 106  Resp:  [12-63] 18  BP: ()/(55-84) 118/67  SpO2:  [90 %-99 %] 96 %    Physical Exam  Physical Exam  Vitals reviewed.   Constitutional:       General: She is not in acute distress.  HENT:      Head: Normocephalic.   Eyes:      General: No scleral icterus.     Extraocular Movements: Extraocular movements intact.   Cardiovascular:      Rate and Rhythm: Normal rate and regular rhythm.      Heart sounds: Normal heart sounds, S1 normal and S2 normal. No murmur heard.   No friction rub. No gallop.    Pulmonary:      Effort: Pulmonary effort is normal.      Breath sounds: Normal breath sounds. No wheezing, rhonchi or rales.   Chest:      Comments: Exquisitely tender to palpation  Musculoskeletal:      Right lower leg: No edema.      Left lower leg: No edema.   Skin:     General: Skin is warm and dry.   Neurological:      Mental Status: She is alert and oriented to person, place, and time.   Psychiatric:         Behavior: Behavior normal.         Lab Review  Lab Results   Component Value Date/Time    WBC 5.7 11/04/2021 04:44 PM    RBC 4.46 11/04/2021 04:44 PM    HEMOGLOBIN 10.9 (L) 11/04/2021 04:44 PM    HEMATOCRIT 36.4 (L) 11/04/2021 04:44 PM    MCV 81.6 11/04/2021 04:44 PM    MCH 24.4 (L) 11/04/2021 04:44 PM    MCHC 29.9 (L) 11/04/2021 04:44 PM    MPV 10.7 11/04/2021 04:44 PM      Lab Results   Component Value Date/Time    SODIUM 134 (L) 11/04/2021  10:51 PM    POTASSIUM 3.8 11/04/2021 10:51 PM    CHLORIDE 103 11/04/2021 10:51 PM    CO2 20 11/04/2021 10:51 PM    GLUCOSE 96 11/04/2021 10:51 PM    BUN 8 11/04/2021 10:51 PM    CREATININE 0.49 (L) 11/04/2021 10:51 PM      Lab Results   Component Value Date/Time    ASTSGOT 118 (H) 11/04/2021 04:44 PM    ALTSGPT 31 11/04/2021 04:44 PM     Lab Results   Component Value Date/Time    CHOLSTRLTOT 176 11/04/2021 10:51 PM     (H) 11/04/2021 10:51 PM    HDL 59 11/04/2021 10:51 PM    TRIGLYCERIDE 75 11/04/2021 10:51 PM    TROPONINT 1629 (H) 11/05/2021 06:40 AM       No results for input(s): NTPROBNP in the last 72 hours.    Cardiac Imaging and Procedures Review  EKG:  My personal interpretation of the EKG dated 11/5/2021 is sinus rhythm, rate 89.  Diffuse ST elevation with evolving anterior lateral T wave abnormalities     Imaging  Chest X-Ray: 7/4/2021  1.  No acute cardiopulmonary process is seen.  2.  Probable exostosis arising from the anterior third rib on the right near the costochondral junction. Correlation with prior imaging would be of value. Further delineation can be performed with CT.    Assessment  1.  ACS with evolving EKG changes.  2.  Polysubstance abuse; methamphetamine, tobacco, alcohol.  3.  Incomplete COVID-19 vaccination.  4.  Anemia.    Recommendation Discussion  1.  The patient presents with ACS with persistent chest pain increasing troponin levels and evolving ECG changes with active polysubstance abuse and incomplete COVID-19 infection.  Etiologies include coronary occlusion, myopericarditis.  2.  Agree with aspirin, atorvastatin.  Beta-blockers probably acceptable since last methamphetamine use was 48 hours ago.  3.  Topical nitrates.  4.  IV fluid hydration.  5.  Stat echocardiogram which is pending and if there is wall motion abnormalities would consider urgent cardiac catheterization.  6.  COVID-19 assay.    Discussed plan of care with Dr. Belinda Mock    Thank you for allowing me to  participate in the care of this patient.        Please contact me with any questions.    Kris Faulkner M.D.   Cardiologist, [facility]  [phone]

## 2021-11-05 NOTE — ED NOTES
Patient resting on stretcher in no acute distress. Equal chest rise noted. VS stable on monitor. Bed locked and in low position. Call bell within reach

## 2021-11-05 NOTE — ASSESSMENT & PLAN NOTE
Troponin peak of 1749, patient with active chest pain, EKG with diffuse ST elevation with evolving anterior lateral T wave abnormalities, started on heparin drip.  ECHO with LVEF 60%, akinesis of apex, no valvular abnormalities.   Cardiology was consulted, patient had a C 11/5/2021 noting single-vessel nonobstructive CAD with LVEF 50%.    Patient started on aspirin, Plavix, beta-blocker, and statin therapy.

## 2021-11-05 NOTE — PROGRESS NOTES
Cardiology follow-up:  Echocardiogram shows apical wall motion abnormality with preserved EF.  The patient continues to have chest pain despite medical therapy with heparin, nitroglycerin, metoprolol.  I discussed with the patient her current cardiac condition in detail and also spoke with her mother by phone explaining the current situation and recommendation for urgent cardiac catheterization explaining the reason for the procedure, the potential treatment including PCI and the need for complete compliance with medications as well as the potential risks.  The patient expressed understanding of the situation and answered her and her mother's questions and she wishes to proceed.  I have spoken with the cardiology interventional list Dr. Webb

## 2021-11-05 NOTE — H&P
Hospital Medicine History & Physical Note    Date of Service  11/4/2021    Primary Care Physician  Pcp Pt States None    Consultants  cardiology    Specialist Names:  To    Code Status  Full Code    Chief Complaint  Chief Complaint   Patient presents with   • Detox     From alcohol and last drink was yesterday. The patient reports nausea, HA, weakness, hot flashes.       History of Presenting Illness  Jerica Espinal is a 36 y.o. female with history of alcohol and methamphetamine abuse who presented 11/4/2021 as transfer from [place] for methamphetamine induced non-ST elevation MI.  Patient left AMA but returns after a fall without injury.  Patient admits homeless.  Labs reveal troponin of 1335 down from 1499, no acute ST changes on EKG.  She denies chest pain nausea vomiting diaphoresis.  Cardiology is consulted and she is referred to the hospitalist for admission.      I discussed the plan of care with patient.    Review of Systems  Review of Systems   Constitutional: Negative for fever, malaise/fatigue and weight loss.   HENT: Negative for sore throat and tinnitus.    Eyes: Negative for blurred vision and double vision.   Respiratory: Negative for cough, hemoptysis and stridor.    Cardiovascular: Negative for chest pain and palpitations.   Gastrointestinal: Negative for nausea and vomiting.   Genitourinary: Negative for dysuria and urgency.   Musculoskeletal: Negative for myalgias and neck pain.   Skin: Negative for itching and rash.   Neurological: Negative for dizziness and headaches.   Endo/Heme/Allergies: Does not bruise/bleed easily.   Psychiatric/Behavioral: Negative for depression. The patient does not have insomnia.        Past Medical History   has a past medical history of Hypertensive emergency (11/4/2021), NSTEMI (non-ST elevated myocardial infarction) (HCC) (11/4/2021), and Pre-eclampsia added to pre-existing hypertension.    Surgical History   has a past surgical history that includes [id number].      Family History  family history is not on file.   Family history reviewed with patient. There is no family history that is pertinent to the chief complaint.     Social History   reports that she has been smoking cigarettes. She has never used smokeless tobacco. She reports current alcohol use. She reports previous drug use.    Allergies  Allergies   Allergen Reactions   • Bee Venom Unspecified     Unknown reaction     • Hydrocodone Unspecified     Unknown reaction         Medications  Prior to Admission Medications   Prescriptions Last Dose Informant Patient Reported? Taking?   LORazepam (ATIVAN) 1 MG Tab PRN at PRN Patient's Home Pharmacy Yes No   Sig: Take 1 mg by mouth 3 times a day as needed (Withdrawl symptoms).      Facility-Administered Medications: None       Physical Exam  Temp:  [36 °C (96.8 °F)-36.9 °C (98.4 °F)] 36.9 °C (98.4 °F)  Pulse:  [82-99] 92  Resp:  [15-63] 18  BP: (121-163)/() 122/80  SpO2:  [94 %-99 %] 94 %  Blood Pressure: 122/80   Temperature: 36.9 °C (98.4 °F)   Pulse: 92   Respiration: 18   Pulse Oximetry: 94 %       Physical Exam  Vitals and nursing note reviewed.   Constitutional:       General: She is not in acute distress.     Appearance: Normal appearance. She is normal weight. She is not toxic-appearing.      Comments: Disheveled   HENT:      Head: Normocephalic and atraumatic.      Nose: Nose normal. No congestion or rhinorrhea.      Mouth/Throat:      Mouth: Mucous membranes are moist.      Pharynx: Oropharynx is clear.   Eyes:      Extraocular Movements: Extraocular movements intact.      Conjunctiva/sclera: Conjunctivae normal.      Pupils: Pupils are equal, round, and reactive to light.   Neck:      Vascular: No carotid bruit.   Cardiovascular:      Rate and Rhythm: Normal rate and regular rhythm.      Pulses: Normal pulses.      Heart sounds: Normal heart sounds. No murmur heard.   No gallop.    Pulmonary:      Effort: No respiratory distress.      Breath sounds: Normal  breath sounds. No wheezing or rales.   Abdominal:      General: Abdomen is flat. Bowel sounds are normal. There is no distension.      Palpations: Abdomen is soft. There is no mass.      Tenderness: There is no abdominal tenderness.      Hernia: No hernia is present.   Musculoskeletal:         General: No tenderness or signs of injury.      Cervical back: Normal range of motion and neck supple. No muscular tenderness.   Lymphadenopathy:      Cervical: No cervical adenopathy.   Skin:     Capillary Refill: Capillary refill takes less than 2 seconds.      Coloration: Skin is not jaundiced or pale.      Findings: No bruising.   Neurological:      General: No focal deficit present.      Mental Status: She is alert and oriented to person, place, and time. Mental status is at baseline.      Cranial Nerves: No cranial nerve deficit.      Motor: No weakness.      Coordination: Coordination normal.   Psychiatric:         Mood and Affect: Mood normal.         Thought Content: Thought content normal.         Judgment: Judgment normal.         Laboratory:  Recent Labs     11/04/21  0026 11/04/21  1644   WBC 5.6 5.7   RBC 4.19* 4.46   HEMOGLOBIN 10.2* 10.9*   HEMATOCRIT 34.0* 36.4*   MCV 81.1* 81.6   MCH 24.3* 24.4*   MCHC 30.0* 29.9*   RDW 49.0 49.1   PLATELETCT 264 257   MPV 12.6 10.7     Recent Labs     11/04/21  0429 11/04/21  1644   SODIUM 136 137   POTASSIUM 3.4* 3.8   CHLORIDE 103 101   CO2 19* 23   GLUCOSE 98 95   BUN 10 10   CREATININE 0.50 0.57   CALCIUM 8.8 9.8     Recent Labs     11/04/21  0429 11/04/21  1644   ALTSGPT  --  31   ASTSGOT  --  118*   ALKPHOSPHAT  --  125*   TBILIRUBIN  --  0.5   GLUCOSE 98 95         No results for input(s): NTPROBNP in the last 72 hours.      Recent Labs     11/04/21  0429 11/04/21  1219 11/04/21  1644   TROPONINT 1410* 1499* 1335*       Imaging:  DX-CHEST-PORTABLE (1 VIEW)   Final Result      1.  No acute cardiopulmonary process is seen.   2.  Probable exostosis arising from the  anterior third rib on the right near the costochondral junction. Correlation with prior imaging would be of value. Further delineation can be performed with CT.      EC-ECHOCARDIOGRAM COMPLETE W/O CONT    (Results Pending)       EKG:  I have personally reviewed the images and compared with prior images.    Assessment/Plan:  I anticipate this patient will require at least two midnights for appropriate medical management, necessitating inpatient admission.    NSTEMI (non-ST elevated myocardial infarction) (HCC)- (present on admission)  Assessment & Plan  Methamphetamine induced coronary vasospasm, benzodiazepine, nitrates, amlodipine  Follow-up troponin, total CK and cardiology consult.    Hypertensive emergency- (present on admission)  Assessment & Plan  Amlodipine, nitroglycerin, metoprolol, hydralazine as needed    Alcohol abuse- (present on admission)  Assessment & Plan  CIWA protocol    Methamphetamine abuse (HCC)- (present on admission)  Assessment & Plan  Education, supportive care, benzodiazepine as needed      VTE prophylaxis: SCDs/TEDs

## 2021-11-05 NOTE — ED NOTES
"Assumed care of patient. Pt lethargic- awakes to gentle touch but falls back asleep quickly. States \"I dont feel good\" but doesn't elaborate. Denies nausea. Endorses H/A  "

## 2021-11-06 ENCOUNTER — PATIENT OUTREACH (OUTPATIENT)
Dept: HEALTH INFORMATION MANAGEMENT | Facility: OTHER | Age: 36
End: 2021-11-06

## 2021-11-06 PROBLEM — I24.9 ACS (ACUTE CORONARY SYNDROME) (HCC): Status: ACTIVE | Noted: 2021-11-04

## 2021-11-06 LAB
ALBUMIN SERPL BCP-MCNC: 3.2 G/DL (ref 3.2–4.9)
ALBUMIN/GLOB SERPL: 1 G/DL
ALP SERPL-CCNC: 94 U/L (ref 30–99)
ALT SERPL-CCNC: 15 U/L (ref 2–50)
ANION GAP SERPL CALC-SCNC: 13 MMOL/L (ref 7–16)
AST SERPL-CCNC: 35 U/L (ref 12–45)
BASOPHILS # BLD AUTO: 0.2 % (ref 0–1.8)
BASOPHILS # BLD: 0.01 K/UL (ref 0–0.12)
BILIRUB SERPL-MCNC: 0.2 MG/DL (ref 0.1–1.5)
BUN SERPL-MCNC: 13 MG/DL (ref 8–22)
CALCIUM SERPL-MCNC: 8.9 MG/DL (ref 8.5–10.5)
CHLORIDE SERPL-SCNC: 103 MMOL/L (ref 96–112)
CO2 SERPL-SCNC: 21 MMOL/L (ref 20–33)
CREAT SERPL-MCNC: 0.52 MG/DL (ref 0.5–1.4)
EOSINOPHIL # BLD AUTO: 0.02 K/UL (ref 0–0.51)
EOSINOPHIL NFR BLD: 0.4 % (ref 0–6.9)
ERYTHROCYTE [DISTWIDTH] IN BLOOD BY AUTOMATED COUNT: 48.5 FL (ref 35.9–50)
GLOBULIN SER CALC-MCNC: 3.1 G/DL (ref 1.9–3.5)
GLUCOSE SERPL-MCNC: 93 MG/DL (ref 65–99)
HCT VFR BLD AUTO: 28.3 % (ref 37–47)
HGB BLD-MCNC: 8.5 G/DL (ref 12–16)
HGB RETIC QN AUTO: 22.9 PG/CELL (ref 29–35)
IMM GRANULOCYTES # BLD AUTO: 0.02 K/UL (ref 0–0.11)
IMM GRANULOCYTES NFR BLD AUTO: 0.4 % (ref 0–0.9)
IMM RETICS NFR: 12.2 % (ref 9.3–17.4)
IRON SATN MFR SERPL: 4 % (ref 15–55)
IRON SERPL-MCNC: 11 UG/DL (ref 40–170)
LYMPHOCYTES # BLD AUTO: 0.97 K/UL (ref 1–4.8)
LYMPHOCYTES NFR BLD: 21.2 % (ref 22–41)
MCH RBC QN AUTO: 24.1 PG (ref 27–33)
MCHC RBC AUTO-ENTMCNC: 30 G/DL (ref 33.6–35)
MCV RBC AUTO: 80.2 FL (ref 81.4–97.8)
MONOCYTES # BLD AUTO: 0.74 K/UL (ref 0–0.85)
MONOCYTES NFR BLD AUTO: 16.2 % (ref 0–13.4)
NEUTROPHILS # BLD AUTO: 2.82 K/UL (ref 2–7.15)
NEUTROPHILS NFR BLD: 61.6 % (ref 44–72)
NRBC # BLD AUTO: 0 K/UL
NRBC BLD-RTO: 0 /100 WBC
PLATELET # BLD AUTO: 210 K/UL (ref 164–446)
PMV BLD AUTO: 10.9 FL (ref 9–12.9)
POTASSIUM SERPL-SCNC: 3.6 MMOL/L (ref 3.6–5.5)
PROT SERPL-MCNC: 6.3 G/DL (ref 6–8.2)
RBC # BLD AUTO: 3.53 M/UL (ref 4.2–5.4)
RETICS # AUTO: 0.04 M/UL (ref 0.04–0.06)
RETICS/RBC NFR: 1.3 % (ref 0.8–2.1)
SODIUM SERPL-SCNC: 137 MMOL/L (ref 135–145)
TIBC SERPL-MCNC: 301 UG/DL (ref 250–450)
UFH PPP CHRO-ACNC: 0.11 IU/ML
UFH PPP CHRO-ACNC: 0.12 IU/ML
UIBC SERPL-MCNC: 290 UG/DL (ref 110–370)
WBC # BLD AUTO: 4.6 K/UL (ref 4.8–10.8)

## 2021-11-06 PROCEDURE — 85025 COMPLETE CBC W/AUTO DIFF WBC: CPT

## 2021-11-06 PROCEDURE — A9270 NON-COVERED ITEM OR SERVICE: HCPCS | Performed by: GENERAL PRACTICE

## 2021-11-06 PROCEDURE — 83550 IRON BINDING TEST: CPT

## 2021-11-06 PROCEDURE — 700102 HCHG RX REV CODE 250 W/ 637 OVERRIDE(OP): Performed by: INTERNAL MEDICINE

## 2021-11-06 PROCEDURE — 700111 HCHG RX REV CODE 636 W/ 250 OVERRIDE (IP): Performed by: GENERAL PRACTICE

## 2021-11-06 PROCEDURE — 770020 HCHG ROOM/CARE - TELE (206)

## 2021-11-06 PROCEDURE — 700105 HCHG RX REV CODE 258: Performed by: GENERAL PRACTICE

## 2021-11-06 PROCEDURE — A9270 NON-COVERED ITEM OR SERVICE: HCPCS | Performed by: INTERNAL MEDICINE

## 2021-11-06 PROCEDURE — 700111 HCHG RX REV CODE 636 W/ 250 OVERRIDE (IP): Performed by: INTERNAL MEDICINE

## 2021-11-06 PROCEDURE — 85046 RETICYTE/HGB CONCENTRATE: CPT

## 2021-11-06 PROCEDURE — 99232 SBSQ HOSP IP/OBS MODERATE 35: CPT | Performed by: INTERNAL MEDICINE

## 2021-11-06 PROCEDURE — 99232 SBSQ HOSP IP/OBS MODERATE 35: CPT | Performed by: GENERAL PRACTICE

## 2021-11-06 PROCEDURE — 36415 COLL VENOUS BLD VENIPUNCTURE: CPT

## 2021-11-06 PROCEDURE — 83540 ASSAY OF IRON: CPT

## 2021-11-06 PROCEDURE — 85520 HEPARIN ASSAY: CPT | Mod: 91

## 2021-11-06 PROCEDURE — 700102 HCHG RX REV CODE 250 W/ 637 OVERRIDE(OP): Performed by: GENERAL PRACTICE

## 2021-11-06 PROCEDURE — 80053 COMPREHEN METABOLIC PANEL: CPT

## 2021-11-06 RX ORDER — ACETAMINOPHEN 325 MG/1
650 TABLET ORAL ONCE
Status: COMPLETED | OUTPATIENT
Start: 2021-11-06 | End: 2021-11-06

## 2021-11-06 RX ORDER — CLOPIDOGREL BISULFATE 75 MG/1
300 TABLET ORAL ONCE
Status: COMPLETED | OUTPATIENT
Start: 2021-11-06 | End: 2021-11-06

## 2021-11-06 RX ORDER — DIPHENHYDRAMINE HYDROCHLORIDE 50 MG/ML
25 INJECTION INTRAMUSCULAR; INTRAVENOUS ONCE
Status: COMPLETED | OUTPATIENT
Start: 2021-11-06 | End: 2021-11-06

## 2021-11-06 RX ORDER — LANOLIN ALCOHOL/MO/W.PET/CERES
325 CREAM (GRAM) TOPICAL
Qty: 90 TABLET | Refills: 0 | Status: SHIPPED | OUTPATIENT
Start: 2021-11-06 | End: 2021-12-06

## 2021-11-06 RX ORDER — ASPIRIN 81 MG/1
81 TABLET, CHEWABLE ORAL DAILY
Status: DISCONTINUED | OUTPATIENT
Start: 2021-11-07 | End: 2021-11-07 | Stop reason: HOSPADM

## 2021-11-06 RX ORDER — METOPROLOL TARTRATE 50 MG/1
50 TABLET, FILM COATED ORAL 2 TIMES DAILY
Qty: 180 TABLET | Refills: 0 | Status: SHIPPED | OUTPATIENT
Start: 2021-11-06 | End: 2022-02-04

## 2021-11-06 RX ORDER — CLOPIDOGREL BISULFATE 75 MG/1
75 TABLET ORAL DAILY
Status: DISCONTINUED | OUTPATIENT
Start: 2021-11-07 | End: 2021-11-07 | Stop reason: HOSPADM

## 2021-11-06 RX ORDER — DIPHENHYDRAMINE HCL 25 MG
25 TABLET ORAL ONCE
Status: COMPLETED | OUTPATIENT
Start: 2021-11-06 | End: 2021-11-06

## 2021-11-06 RX ORDER — GAUZE BANDAGE 2" X 2"
100 BANDAGE TOPICAL DAILY
Status: DISCONTINUED | OUTPATIENT
Start: 2021-11-06 | End: 2021-11-07 | Stop reason: HOSPADM

## 2021-11-06 RX ORDER — LANOLIN ALCOHOL/MO/W.PET/CERES
100 CREAM (GRAM) TOPICAL DAILY
Qty: 90 TABLET | Refills: 0 | Status: SHIPPED | OUTPATIENT
Start: 2021-11-06 | End: 2022-02-04

## 2021-11-06 RX ORDER — ASPIRIN 81 MG/1
81 TABLET, CHEWABLE ORAL DAILY
Qty: 90 TABLET | Refills: 0 | Status: SHIPPED | OUTPATIENT
Start: 2021-11-07 | End: 2022-02-05

## 2021-11-06 RX ORDER — FOLIC ACID 1 MG/1
1 TABLET ORAL DAILY
Qty: 90 TABLET | Refills: 0 | Status: SHIPPED | OUTPATIENT
Start: 2021-11-06 | End: 2022-02-04

## 2021-11-06 RX ORDER — CLOPIDOGREL BISULFATE 75 MG/1
75 TABLET ORAL DAILY
Qty: 90 TABLET | Refills: 0 | Status: SHIPPED | OUTPATIENT
Start: 2021-11-07 | End: 2022-02-05

## 2021-11-06 RX ORDER — ATORVASTATIN CALCIUM 40 MG/1
40 TABLET, FILM COATED ORAL EVERY EVENING
Qty: 90 TABLET | Refills: 0 | Status: SHIPPED | OUTPATIENT
Start: 2021-11-06 | End: 2022-02-04

## 2021-11-06 RX ORDER — FOLIC ACID 1 MG/1
1 TABLET ORAL DAILY
Status: DISCONTINUED | OUTPATIENT
Start: 2021-11-06 | End: 2021-11-07 | Stop reason: HOSPADM

## 2021-11-06 RX ORDER — ATORVASTATIN CALCIUM 40 MG/1
40 TABLET, FILM COATED ORAL EVERY EVENING
Status: DISCONTINUED | OUTPATIENT
Start: 2021-11-06 | End: 2021-11-07 | Stop reason: HOSPADM

## 2021-11-06 RX ADMIN — CLOPIDOGREL BISULFATE 300 MG: 75 TABLET ORAL at 09:13

## 2021-11-06 RX ADMIN — SODIUM CHLORIDE 25 MG: 9 INJECTION, SOLUTION INTRAVENOUS at 18:24

## 2021-11-06 RX ADMIN — LORAZEPAM 1 MG: 1 TABLET ORAL at 13:34

## 2021-11-06 RX ADMIN — METOPROLOL TARTRATE 50 MG: 50 TABLET, FILM COATED ORAL at 04:03

## 2021-11-06 RX ADMIN — METOPROLOL TARTRATE 50 MG: 50 TABLET, FILM COATED ORAL at 17:51

## 2021-11-06 RX ADMIN — OXYCODONE 5 MG: 5 TABLET ORAL at 18:06

## 2021-11-06 RX ADMIN — DIPHENHYDRAMINE HYDROCHLORIDE 25 MG: 25 TABLET ORAL at 17:52

## 2021-11-06 RX ADMIN — ASPIRIN 325 MG ORAL TABLET 325 MG: 325 PILL ORAL at 04:03

## 2021-11-06 RX ADMIN — HEPARIN SODIUM 2000 UNITS: 1000 INJECTION INTRAVENOUS; SUBCUTANEOUS at 02:23

## 2021-11-06 RX ADMIN — ATORVASTATIN CALCIUM 40 MG: 40 TABLET, FILM COATED ORAL at 17:51

## 2021-11-06 RX ADMIN — ACETAMINOPHEN 650 MG: 325 TABLET ORAL at 17:51

## 2021-11-06 RX ADMIN — OXYCODONE 5 MG: 5 TABLET ORAL at 06:27

## 2021-11-06 RX ADMIN — OXYCODONE 5 MG: 5 TABLET ORAL at 13:53

## 2021-11-06 RX ADMIN — AMLODIPINE BESYLATE 5 MG: 5 TABLET ORAL at 04:03

## 2021-11-06 RX ADMIN — LORAZEPAM 1 MG: 1 TABLET ORAL at 02:26

## 2021-11-06 RX ADMIN — THERA TABS 1 TABLET: TAB at 04:03

## 2021-11-06 RX ADMIN — LORAZEPAM 1 MG: 1 TABLET ORAL at 06:27

## 2021-11-06 RX ADMIN — FOLIC ACID 1 MG: 1 TABLET ORAL at 13:34

## 2021-11-06 RX ADMIN — SODIUM CHLORIDE 1675 MG: 9 INJECTION, SOLUTION INTRAVENOUS at 19:59

## 2021-11-06 RX ADMIN — Medication 100 MG: at 13:34

## 2021-11-06 RX ADMIN — OXYCODONE HYDROCHLORIDE 10 MG: 10 TABLET ORAL at 23:28

## 2021-11-06 RX ADMIN — OXYCODONE 5 MG: 5 TABLET ORAL at 02:26

## 2021-11-06 RX ADMIN — HYDROMORPHONE HYDROCHLORIDE 0.5 MG: 1 INJECTION, SOLUTION INTRAMUSCULAR; INTRAVENOUS; SUBCUTANEOUS at 19:58

## 2021-11-06 ASSESSMENT — LIFESTYLE VARIABLES
TREMOR: NO TREMOR
AGITATION: NORMAL ACTIVITY
HEADACHE, FULLNESS IN HEAD: NOT PRESENT
ORIENTATION AND CLOUDING OF SENSORIUM: ORIENTED AND CAN DO SERIAL ADDITIONS
AGITATION: *
TOTAL SCORE: 4
PAROXYSMAL SWEATS: NO SWEAT VISIBLE
VISUAL DISTURBANCES: NOT PRESENT
HEADACHE, FULLNESS IN HEAD: NOT PRESENT
NAUSEA AND VOMITING: NO NAUSEA AND NO VOMITING
ORIENTATION AND CLOUDING OF SENSORIUM: ORIENTED AND CAN DO SERIAL ADDITIONS
ANXIETY: *
TOTAL SCORE: 1
ANXIETY: MILDLY ANXIOUS
AGITATION: SOMEWHAT MORE THAN NORMAL ACTIVITY
HEADACHE, FULLNESS IN HEAD: NOT PRESENT
ANXIETY: MODERATELY ANXIOUS OR GUARDED, SO ANXIETY IS INFERRED
VISUAL DISTURBANCES: NOT PRESENT
VISUAL DISTURBANCES: NOT PRESENT
PAROXYSMAL SWEATS: NO SWEAT VISIBLE
TREMOR: NO TREMOR
AUDITORY DISTURBANCES: NOT PRESENT
PAROXYSMAL SWEATS: NO SWEAT VISIBLE
NAUSEA AND VOMITING: NO NAUSEA AND NO VOMITING
NAUSEA AND VOMITING: NO NAUSEA AND NO VOMITING
TREMOR: NO TREMOR
AUDITORY DISTURBANCES: NOT PRESENT
AUDITORY DISTURBANCES: NOT PRESENT
TOTAL SCORE: 10
ORIENTATION AND CLOUDING OF SENSORIUM: ORIENTED AND CAN DO SERIAL ADDITIONS

## 2021-11-06 ASSESSMENT — ENCOUNTER SYMPTOMS
CHOKING: 0
APNEA: 0
CHEST TIGHTNESS: 0
COUGH: 0
STRIDOR: 0
SHORTNESS OF BREATH: 0
WHEEZING: 0

## 2021-11-06 ASSESSMENT — PATIENT HEALTH QUESTIONNAIRE - PHQ9
1. LITTLE INTEREST OR PLEASURE IN DOING THINGS: NOT AT ALL
SUM OF ALL RESPONSES TO PHQ9 QUESTIONS 1 AND 2: 0
2. FEELING DOWN, DEPRESSED, IRRITABLE, OR HOPELESS: NOT AT ALL

## 2021-11-06 ASSESSMENT — PAIN DESCRIPTION - PAIN TYPE: TYPE: ACUTE PAIN

## 2021-11-06 ASSESSMENT — FIBROSIS 4 INDEX: FIB4 SCORE: 1.635259635065353796

## 2021-11-06 NOTE — OR NURSING
PACU note- Respirations easy. Denies pain upon arrival to PACU. TR band present to right radial site.  Right hand warm, nailbeds pink with brisk cap refill, fingers mobile, denies numbness/tingling.  1715  As I was getting pt ready to go to Telemetry, she began c/o 8/10 chest pain.  Monitor shows NSR. No changes from previously.  She immediately fell back asleep.  Dr. Webb texted and aware, no orders received, he texted to have the telemetry nurse get orders from Dr. Wallace. I spoke with Dr. Wallace at 1800 after patient was up on telemetry and spoke with him about the patient.  TR band has 4 cc's removed upon transfer to Telemetry.  Patient;s mother updated. CMS to right hand remains intact. Patient smiling upon leaving PACU.

## 2021-11-06 NOTE — PROGRESS NOTES
IV Iron Per Pharmacy Note    Patient Lean Body Weight:  65 kg  Reason for Iron Replacement: Iron Deficiency Anemia      Lab Results   Component Value Date/Time    WBC 4.6 (L) 11/06/2021 12:49 AM    RBC 3.53 (L) 11/06/2021 12:49 AM    HEMOGLOBIN 8.5 (L) 11/06/2021 12:49 AM    HEMATOCRIT 28.3 (L) 11/06/2021 12:49 AM    MCV 80.2 (L) 11/06/2021 12:49 AM    MCH 24.1 (L) 11/06/2021 12:49 AM    MCHC 30.0 (L) 11/06/2021 12:49 AM    MPV 10.9 11/06/2021 12:49 AM       Recent Labs     11/06/21  0846   IRON 11*         Recent Labs     11/06/21  0049   CREATININE 0.52          Assessment/Plan:  1. IV Iron Indicated.   2. Following diphenhydramine/acetaminophen premeds, administer Iron Dextran 25 mg IV test dose over 30 minutes.  3. If no reaction (Anaphylaxis, Hypotension/Hypertension, N/V/D, Chest pain/Back Pain, Urticaria/Pruritis) in the next hour, proceed to full dose.   4. Full dose: Iron Dextran 1675 mg IV over 4 hours. Continue to monitor for delayed ADR including: Arthralgia/myalgia, Headache/backache, chills/dizziness/malaise, moderate to high fever and n/v.      Moisés Florez, PharmD

## 2021-11-06 NOTE — PROGRESS NOTES
"Patient new to floor at 1751. Report received from MALU Church. Patient is A&O X4.   Call light and fall precautions in place. Patient is having 10/10 pressure in chest. Says it feels \"like someone is squeezing her chest.\" MD notified and aware. TR band in place, 3cc removed, no signs of bleeding.    "

## 2021-11-06 NOTE — PROGRESS NOTES
Received report from day shift RNNaomi. Assumed care of pt @ 1900. Pt reports no pain at this time. Updated pt on plan of care. Pt resting comfortably in bed. Fall precautions in place. Educated on use of call light. Hourly rounding and continuous monitoring in place.

## 2021-11-06 NOTE — PROGRESS NOTES
Cardiology Follow Up Progress Note    Date of Service  11/6/2021    Attending Physician  Milady Paredes D.O.    Presented with 2-day history of sharp anterior chest pain associated with dyspnea, nausea, vomiting.    Cardiology consultation for NSTEMI    PMH: No prior cardiac history polysubstance abuse including chronic active methamphetamine use, alcohol abuse, chronic tobacco use      Interim Events    No overnight cardiac events   Telemetry-SR   at bedside  Questions answered  No recurrent chest pain  BP appropriate  CMP in excellent order  Anemic stop IV heparin  H&H 8.5/28.30      Review of Systems  Review of Systems   Respiratory: Negative for apnea, cough, choking, chest tightness, shortness of breath, wheezing and stridor.        Vital signs in last 24 hours  Temp:  [36.4 °C (97.6 °F)-36.9 °C (98.4 °F)] 36.9 °C (98.4 °F)  Pulse:  [] 99  Resp:  [12-36] 16  BP: (100-127)/(55-76) 100/61  SpO2:  [91 %-99 %] 92 %    Physical Exam  Physical Exam  Cardiovascular:      Rate and Rhythm: Normal rate and regular rhythm.   Pulmonary:      Effort: Pulmonary effort is normal.   Skin:     General: Skin is warm.      Comments: Right radial cath site without evidence of hematoma, good circulation   Neurological:      Mental Status: She is alert. Mental status is at baseline.   Psychiatric:         Mood and Affect: Mood normal.         Lab Review  Lab Results   Component Value Date/Time    WBC 4.6 (L) 11/06/2021 12:49 AM    RBC 3.53 (L) 11/06/2021 12:49 AM    HEMOGLOBIN 8.5 (L) 11/06/2021 12:49 AM    HEMATOCRIT 28.3 (L) 11/06/2021 12:49 AM    MCV 80.2 (L) 11/06/2021 12:49 AM    MCH 24.1 (L) 11/06/2021 12:49 AM    MCHC 30.0 (L) 11/06/2021 12:49 AM    MPV 10.9 11/06/2021 12:49 AM      Lab Results   Component Value Date/Time    SODIUM 137 11/06/2021 12:49 AM    POTASSIUM 3.6 11/06/2021 12:49 AM    CHLORIDE 103 11/06/2021 12:49 AM    CO2 21 11/06/2021 12:49 AM    GLUCOSE 93 11/06/2021 12:49 AM    BUN 13 11/06/2021  12:49 AM    CREATININE 0.52 11/06/2021 12:49 AM      Lab Results   Component Value Date/Time    ASTSGOT 35 11/06/2021 12:49 AM    ALTSGPT 15 11/06/2021 12:49 AM     Lab Results   Component Value Date/Time    CHOLSTRLTOT 176 11/04/2021 10:51 PM     (H) 11/04/2021 10:51 PM    HDL 59 11/04/2021 10:51 PM    TRIGLYCERIDE 75 11/04/2021 10:51 PM    TROPONINT 1320 (H) 11/05/2021 12:47 PM       No results for input(s): NTPROBNP in the last 72 hours.    Cardiac Imaging and Procedures Review  EKG: SR , rate 89.  Diffuse ST elevation with evolving anterior lateral T wave abnormalities        Echocardiogram:    11/5/2021  LVEF 60%, focal apical akinesis.    Cardiac Catheterization:    Single vessel non-obstructive CAD     Findings:  1.  Left main coronary artery:  Normal.  2.  Left anterior descending artery: Proximal LAD has ulcerated plaque with overlying thrombus with 50% luminal narrowing.  Good flow into diagonal, distal LAD.    3.  Left circumflex coronary artery:  Normal.   4.  Right coronary artery:  Normal.  This is a right dominant system.  5.  Left ventricular end diastolic pressure:  16 mmHg.  No signficant gradient across the aortic valve.  6.  Left ventriculogram:  Ejection fraction         Imaging  Chest X-Ray:  No acute cardiopulmonary process is seen.  2.  Probable exostosis arising from the anterior third rib on the right near the costochondral junction. Correlation with prior imaging would be of value. Further delineation can be performed with CT.    Stress Test: Not applicable    Assessment/Plan    NSTEMI  -s/p [facility] 11/5 showing proximal LAD nonobstructive plaque rupture and distal occlusion of the terminal/apical LAD, no indication for PCI.  -Recommendations for medical therapy.  -Plavix 300 today, 75 mg daily  -Continue with aspirin 81, atorvastatin 40, metoprolol 50 twice daily.  -Echo showing normal LVEF 60%, with focal apical akinesis.    Polysubstance abuse  Methamphetamine, tobacco,  alcohol  -Lifelong abstinence of tobacco, Methamphetamine, Alcohol Use    Covid vaccination, incomplete    Recommend monitoring for another 24 hours and if remains stable could be discharged.  Follow-up appointment with our cardiology next week, will arrange.    Thank you for allowing me to participate in the care of this patient.      Please contact me with any questions.    FRANCICSO Wynn.   Cardiologist, [facility]  [phone]

## 2021-11-06 NOTE — HOSPITAL COURSE
This is a 36 year old female with PMHx of methamphetamine use, alcohol use disorder and chronic tobacco use disorder who was admitted on 11/4/2021 due to recurrent chest pain, noted to have NSTEMI    Of note patient was admitted day prior but she signed out AMA.  Unfortunately patient is homeless and continues to use methamphetamines, last use was 48 hours prior to readmission.    Labs significant for troponin peak of 1749, patient with active chest pain,EKG with diffuse ST elevation with evolving anterior lateral T wave abnormalities, started on heparin drip. ECHO with LVEF 60%, akinesis of apex, no valvular abnormalities.Cardiology was consulted, patient had a LHC 11/5/2021 noting single-vessel nonobstructive CAD with LVEF 50%. Patient started on aspirin, Plavix, beta-blocker, and statin therapy.    Stressed the importance of tobacco, alcohol and methamphetamine cessation to the patient.

## 2021-11-06 NOTE — PROGRESS NOTES
Hospital Medicine Daily Progress Note    Date of Service  11/6/2021    Chief Complaint  Jerica Espinal is a 36 y.o. female admitted 11/4/2021 with chest pain    Hospital Course  This is a 36 year old female with PMHx of methamphetamine use, alcohol use disorder and chronic tobacco use disorder who was admitted on 11/4/2021 due to recurrent chest pain, noted to have NSTEMI    Of note patient was admitted day prior but she signed out AMA.  Unfortunately patient is homeless and continues to use methamphetamines, last use was 48 hours prior to readmission.    Labs significant for troponin peak of 1749, patient with active chest pain,EKG with diffuse ST elevation with evolving anterior lateral T wave abnormalities, started on heparin drip. ECHO with LVEF 60%, akinesis of apex, no valvular abnormalities.Cardiology was consulted, patient had a [facility] 11/5/2021 noting single-vessel nonobstructive CAD with LVEF 50%. Patient started on aspirin, Plavix, beta-blocker, and statin therapy.    Interval Problem Update  Currently patient is without any chest pain.    Patient started on aspirin, Plavix, beta-blocker and statin therapy.    Cardiology recommended patient to continue to be observed another night, anticipate discharge home tomorrow.    All medications have been sent to her pharmacy of choice.    I have personally seen and examined the patient at bedside. I discussed the plan of care with patient and bedside RN.    Consultants/Specialty  cardiology    Code Status  Full Code    Disposition  Patient is not medically cleared.   Anticipate discharge to to home with close outpatient follow-up.  I have placed the appropriate orders for post-discharge needs.    Review of Systems  Review of Systems   All other systems reviewed and are negative.       Physical Exam  Temp:  [36.2 °C (97.2 °F)-36.9 °C (98.4 °F)] 36.4 °C (97.6 °F)  Pulse:  [] 99  Resp:  [12-29] 18  BP: (100-127)/(55-76) 108/66  SpO2:  [91 %-99 %] 96 %    Physical  Exam  Vitals and nursing note reviewed.   Constitutional:       General: She is not in acute distress.     Appearance: Normal appearance.   HENT:      Head: Normocephalic and atraumatic.      Mouth/Throat:      Mouth: Mucous membranes are moist.      Pharynx: No oropharyngeal exudate.   Eyes:      Extraocular Movements: Extraocular movements intact.      Pupils: Pupils are equal, round, and reactive to light.   Cardiovascular:      Rate and Rhythm: Normal rate and regular rhythm.      Pulses: Normal pulses.      Heart sounds: No murmur heard.   No friction rub. No gallop.    Pulmonary:      Effort: Pulmonary effort is normal. No respiratory distress.      Breath sounds: No wheezing, rhonchi or rales.   Abdominal:      General: Bowel sounds are normal. There is no distension.      Palpations: Abdomen is soft. There is no mass.      Tenderness: There is no abdominal tenderness.   Musculoskeletal:         General: No swelling or tenderness. Normal range of motion.      Cervical back: Normal range of motion. No rigidity. No muscular tenderness.      Right lower leg: No edema.      Left lower leg: No edema.   Skin:     General: Skin is warm and dry.      Capillary Refill: Capillary refill takes less than 2 seconds.      Findings: No erythema or rash.   Neurological:      General: No focal deficit present.      Mental Status: She is alert and oriented to person, place, and time.      Motor: No weakness.      Gait: Gait normal.         Fluids  No intake or output data in the 24 hours ending 11/06/21 1329    Laboratory  Recent Labs     11/04/21  0026 11/04/21  1644 11/06/21  0049   WBC 5.6 5.7 4.6*   RBC 4.19* 4.46 3.53*   HEMOGLOBIN 10.2* 10.9* 8.5*   HEMATOCRIT 34.0* 36.4* 28.3*   MCV 81.1* 81.6 80.2*   MCH 24.3* 24.4* 24.1*   MCHC 30.0* 29.9* 30.0*   RDW 49.0 49.1 48.5   PLATELETCT 264 257 210   MPV 12.6 10.7 10.9     Recent Labs     11/04/21  1644 11/04/21  2251 11/06/21  0049   SODIUM 137 134* 137   POTASSIUM 3.8 3.8  3.6   CHLORIDE 101 103 103   CO2 23 20 21   GLUCOSE 95 96 93   BUN 10 8 13   CREATININE 0.57 0.49* 0.52   CALCIUM 9.8 8.9 8.9     Recent Labs     11/05/21  1234   APTT 30.2   INR 1.12         Recent Labs     11/04/21  2251   TRIGLYCERIDE 75   HDL 59   *       Imaging  EC-ECHOCARDIOGRAM COMPLETE W/O CONT   Final Result      DX-CHEST-PORTABLE (1 VIEW)   Final Result      1.  No acute cardiopulmonary process is seen.   2.  Probable exostosis arising from the anterior third rib on the right near the costochondral junction. Correlation with prior imaging would be of value. Further delineation can be performed with CT.      CL-LEFT HEART CATHETERIZATION WITH POSSIBLE INTERVENTION    (Results Pending)        Assessment/Plan  * ACS (acute coronary syndrome) (HCC)- (present on admission)  Assessment & Plan  Troponin peak of 1749, patient with active chest pain, EKG with diffuse ST elevation with evolving anterior lateral T wave abnormalities, started on heparin drip.  ECHO with LVEF 60%, akinesis of apex, no valvular abnormalities.   Cardiology was consulted, patient had a LHC 11/5/2021 noting single-vessel nonobstructive CAD with LVEF 50%.    Patient started on aspirin, Plavix, beta-blocker, and statin therapy.    Alcohol abuse- (present on admission)  Assessment & Plan  CIWA protocol    Methamphetamine abuse (HCC)- (present on admission)  Assessment & Plan  Education, supportive care, benzodiazepine as needed       VTE prophylaxis: SCDs/TEDs and heparin ppx    I have performed a physical exam and reviewed and updated ROS and Plan today (11/6/2021). In review of yesterday's note (11/5/2021), there are no changes except as documented above.

## 2021-11-07 ENCOUNTER — APPOINTMENT (OUTPATIENT)
Dept: RADIOLOGY | Facility: MEDICAL CENTER | Age: 36
End: 2021-11-07
Attending: EMERGENCY MEDICINE
Payer: COMMERCIAL

## 2021-11-07 ENCOUNTER — HOSPITAL ENCOUNTER (EMERGENCY)
Facility: MEDICAL CENTER | Age: 36
End: 2021-11-07
Attending: EMERGENCY MEDICINE
Payer: COMMERCIAL

## 2021-11-07 ENCOUNTER — APPOINTMENT (OUTPATIENT)
Dept: RADIOLOGY | Facility: MEDICAL CENTER | Age: 36
End: 2021-11-07
Payer: COMMERCIAL

## 2021-11-07 VITALS
OXYGEN SATURATION: 96 % | BODY MASS INDEX: 24.25 KG/M2 | RESPIRATION RATE: 20 BRPM | HEART RATE: 109 BPM | TEMPERATURE: 97.2 F | HEIGHT: 68 IN | SYSTOLIC BLOOD PRESSURE: 99 MMHG | DIASTOLIC BLOOD PRESSURE: 56 MMHG | WEIGHT: 160 LBS

## 2021-11-07 VITALS
BODY MASS INDEX: 23.52 KG/M2 | SYSTOLIC BLOOD PRESSURE: 103 MMHG | TEMPERATURE: 98.8 F | OXYGEN SATURATION: 95 % | WEIGHT: 155.2 LBS | RESPIRATION RATE: 18 BRPM | HEART RATE: 85 BPM | DIASTOLIC BLOOD PRESSURE: 52 MMHG | HEIGHT: 68 IN

## 2021-11-07 DIAGNOSIS — R07.9 CHEST PAIN, UNSPECIFIED TYPE: ICD-10-CM

## 2021-11-07 LAB
ALBUMIN SERPL BCP-MCNC: 3.6 G/DL (ref 3.2–4.9)
ALBUMIN/GLOB SERPL: 1 G/DL
ALP SERPL-CCNC: 95 U/L (ref 30–99)
ALT SERPL-CCNC: 10 U/L (ref 2–50)
ANION GAP SERPL CALC-SCNC: 12 MMOL/L (ref 7–16)
ANION GAP SERPL CALC-SCNC: 13 MMOL/L (ref 7–16)
AST SERPL-CCNC: 17 U/L (ref 12–45)
BASOPHILS # BLD AUTO: 0 % (ref 0–1.8)
BASOPHILS # BLD: 0 K/UL (ref 0–0.12)
BILIRUB SERPL-MCNC: 0.3 MG/DL (ref 0.1–1.5)
BUN SERPL-MCNC: 11 MG/DL (ref 8–22)
BUN SERPL-MCNC: 6 MG/DL (ref 8–22)
CALCIUM SERPL-MCNC: 9.2 MG/DL (ref 8.5–10.5)
CALCIUM SERPL-MCNC: 9.4 MG/DL (ref 8.5–10.5)
CHLORIDE SERPL-SCNC: 100 MMOL/L (ref 96–112)
CHLORIDE SERPL-SCNC: 102 MMOL/L (ref 96–112)
CO2 SERPL-SCNC: 20 MMOL/L (ref 20–33)
CO2 SERPL-SCNC: 23 MMOL/L (ref 20–33)
CREAT SERPL-MCNC: 0.44 MG/DL (ref 0.5–1.4)
CREAT SERPL-MCNC: 0.48 MG/DL (ref 0.5–1.4)
EKG IMPRESSION: NORMAL
EOSINOPHIL # BLD AUTO: 0 K/UL (ref 0–0.51)
EOSINOPHIL NFR BLD: 0 % (ref 0–6.9)
ERYTHROCYTE [DISTWIDTH] IN BLOOD BY AUTOMATED COUNT: 47.4 FL (ref 35.9–50)
GLOBULIN SER CALC-MCNC: 3.7 G/DL (ref 1.9–3.5)
GLUCOSE SERPL-MCNC: 89 MG/DL (ref 65–99)
GLUCOSE SERPL-MCNC: 99 MG/DL (ref 65–99)
HCG SERPL QL: NEGATIVE
HCT VFR BLD AUTO: 29 % (ref 37–47)
HGB BLD-MCNC: 8.8 G/DL (ref 12–16)
IMM GRANULOCYTES # BLD AUTO: 0.01 K/UL (ref 0–0.11)
IMM GRANULOCYTES NFR BLD AUTO: 0.2 % (ref 0–0.9)
LYMPHOCYTES # BLD AUTO: 0.93 K/UL (ref 1–4.8)
LYMPHOCYTES NFR BLD: 18.4 % (ref 22–41)
MAGNESIUM SERPL-MCNC: 1.7 MG/DL (ref 1.5–2.5)
MCH RBC QN AUTO: 24 PG (ref 27–33)
MCHC RBC AUTO-ENTMCNC: 30.3 G/DL (ref 33.6–35)
MCV RBC AUTO: 79.2 FL (ref 81.4–97.8)
MONOCYTES # BLD AUTO: 1.2 K/UL (ref 0–0.85)
MONOCYTES NFR BLD AUTO: 23.8 % (ref 0–13.4)
NEUTROPHILS # BLD AUTO: 2.91 K/UL (ref 2–7.15)
NEUTROPHILS NFR BLD: 57.6 % (ref 44–72)
NRBC # BLD AUTO: 0 K/UL
NRBC BLD-RTO: 0 /100 WBC
PLATELET # BLD AUTO: 257 K/UL (ref 164–446)
PMV BLD AUTO: 10.7 FL (ref 9–12.9)
POTASSIUM SERPL-SCNC: 3.4 MMOL/L (ref 3.6–5.5)
POTASSIUM SERPL-SCNC: 3.5 MMOL/L (ref 3.6–5.5)
PROT SERPL-MCNC: 7.3 G/DL (ref 6–8.2)
RBC # BLD AUTO: 3.66 M/UL (ref 4.2–5.4)
SODIUM SERPL-SCNC: 133 MMOL/L (ref 135–145)
SODIUM SERPL-SCNC: 137 MMOL/L (ref 135–145)
TROPONIN T SERPL-MCNC: 1145 NG/L (ref 6–19)
WBC # BLD AUTO: 5.1 K/UL (ref 4.8–10.8)

## 2021-11-07 PROCEDURE — A9270 NON-COVERED ITEM OR SERVICE: HCPCS

## 2021-11-07 PROCEDURE — 700117 HCHG RX CONTRAST REV CODE 255: Performed by: EMERGENCY MEDICINE

## 2021-11-07 PROCEDURE — 71045 X-RAY EXAM CHEST 1 VIEW: CPT

## 2021-11-07 PROCEDURE — 700102 HCHG RX REV CODE 250 W/ 637 OVERRIDE(OP)

## 2021-11-07 PROCEDURE — 71275 CT ANGIOGRAPHY CHEST: CPT

## 2021-11-07 PROCEDURE — 99239 HOSP IP/OBS DSCHRG MGMT >30: CPT | Performed by: GENERAL PRACTICE

## 2021-11-07 PROCEDURE — 80053 COMPREHEN METABOLIC PANEL: CPT

## 2021-11-07 PROCEDURE — 84703 CHORIONIC GONADOTROPIN ASSAY: CPT

## 2021-11-07 PROCEDURE — 83735 ASSAY OF MAGNESIUM: CPT

## 2021-11-07 PROCEDURE — 700111 HCHG RX REV CODE 636 W/ 250 OVERRIDE (IP): Performed by: GENERAL PRACTICE

## 2021-11-07 PROCEDURE — A9270 NON-COVERED ITEM OR SERVICE: HCPCS | Performed by: GENERAL PRACTICE

## 2021-11-07 PROCEDURE — 93005 ELECTROCARDIOGRAM TRACING: CPT

## 2021-11-07 PROCEDURE — 93005 ELECTROCARDIOGRAM TRACING: CPT | Performed by: EMERGENCY MEDICINE

## 2021-11-07 PROCEDURE — 84484 ASSAY OF TROPONIN QUANT: CPT

## 2021-11-07 PROCEDURE — 80048 BASIC METABOLIC PNL TOTAL CA: CPT

## 2021-11-07 PROCEDURE — A9270 NON-COVERED ITEM OR SERVICE: HCPCS | Performed by: INTERNAL MEDICINE

## 2021-11-07 PROCEDURE — 85025 COMPLETE CBC W/AUTO DIFF WBC: CPT

## 2021-11-07 PROCEDURE — 700102 HCHG RX REV CODE 250 W/ 637 OVERRIDE(OP): Performed by: GENERAL PRACTICE

## 2021-11-07 PROCEDURE — 36415 COLL VENOUS BLD VENIPUNCTURE: CPT

## 2021-11-07 PROCEDURE — 99284 EMERGENCY DEPT VISIT MOD MDM: CPT

## 2021-11-07 PROCEDURE — 700102 HCHG RX REV CODE 250 W/ 637 OVERRIDE(OP): Performed by: INTERNAL MEDICINE

## 2021-11-07 RX ORDER — MAGNESIUM SULFATE HEPTAHYDRATE 40 MG/ML
2 INJECTION, SOLUTION INTRAVENOUS ONCE
Status: COMPLETED | OUTPATIENT
Start: 2021-11-07 | End: 2021-11-07

## 2021-11-07 RX ORDER — ASPIRIN 81 MG/1
TABLET, CHEWABLE ORAL
Status: COMPLETED
Start: 2021-11-07 | End: 2021-11-07

## 2021-11-07 RX ORDER — POTASSIUM CHLORIDE 20 MEQ/1
40 TABLET, EXTENDED RELEASE ORAL ONCE
Status: COMPLETED | OUTPATIENT
Start: 2021-11-07 | End: 2021-11-07

## 2021-11-07 RX ORDER — CLOPIDOGREL BISULFATE 75 MG/1
TABLET ORAL
Status: COMPLETED
Start: 2021-11-07 | End: 2021-11-07

## 2021-11-07 RX ADMIN — FOLIC ACID 1 MG: 1 TABLET ORAL at 04:10

## 2021-11-07 RX ADMIN — CLOPIDOGREL BISULFATE 75 MG: 75 TABLET ORAL at 04:10

## 2021-11-07 RX ADMIN — THERA TABS 1 TABLET: TAB at 04:10

## 2021-11-07 RX ADMIN — POTASSIUM CHLORIDE 40 MEQ: 1500 TABLET, EXTENDED RELEASE ORAL at 08:47

## 2021-11-07 RX ADMIN — METOPROLOL TARTRATE 50 MG: 50 TABLET, FILM COATED ORAL at 04:11

## 2021-11-07 RX ADMIN — IOHEXOL 60 ML: 350 INJECTION, SOLUTION INTRAVENOUS at 22:02

## 2021-11-07 RX ADMIN — ASPIRIN 81 MG: 81 TABLET, CHEWABLE ORAL at 04:10

## 2021-11-07 RX ADMIN — OXYCODONE 5 MG: 5 TABLET ORAL at 08:48

## 2021-11-07 RX ADMIN — OXYCODONE HYDROCHLORIDE 10 MG: 10 TABLET ORAL at 03:41

## 2021-11-07 RX ADMIN — MAGNESIUM SULFATE HEPTAHYDRATE 2 G: 40 INJECTION, SOLUTION INTRAVENOUS at 08:47

## 2021-11-07 RX ADMIN — Medication 100 MG: at 04:11

## 2021-11-07 RX ADMIN — DOCUSATE SODIUM 50 MG AND SENNOSIDES 8.6 MG 2 TABLET: 8.6; 5 TABLET, FILM COATED ORAL at 08:47

## 2021-11-07 ASSESSMENT — LIFESTYLE VARIABLES
EVER HAD A DRINK FIRST THING IN THE MORNING TO STEADY YOUR NERVES TO GET RID OF A HANGOVER: NO
VISUAL DISTURBANCES: NOT PRESENT
PAROXYSMAL SWEATS: NO SWEAT VISIBLE
EVER FELT BAD OR GUILTY ABOUT YOUR DRINKING: NO
HAVE YOU EVER FELT YOU SHOULD CUT DOWN ON YOUR DRINKING: NO
ANXIETY: *
TOTAL SCORE: 8
TREMOR: NO TREMOR
TOTAL SCORE: 0
HAVE PEOPLE ANNOYED YOU BY CRITICIZING YOUR DRINKING: NO
HEADACHE, FULLNESS IN HEAD: NOT PRESENT
DO YOU DRINK ALCOHOL: YES
NAUSEA AND VOMITING: NO NAUSEA AND NO VOMITING
AGITATION: *
TOTAL SCORE: 0
AUDITORY DISTURBANCES: NOT PRESENT
CONSUMPTION TOTAL: INCOMPLETE
ORIENTATION AND CLOUDING OF SENSORIUM: ORIENTED AND CAN DO SERIAL ADDITIONS
TOTAL SCORE: 0

## 2021-11-07 ASSESSMENT — FIBROSIS 4 INDEX: FIB4 SCORE: 1.635259635065353796

## 2021-11-07 NOTE — CARE PLAN
The patient is Stable - Low risk of patient condition declining or worsening    Shift Goals  Clinical Goals: Pain management  Patient Goals: Pain management    Progress made toward(s) clinical / shift goals:  Progressing      Problem: Knowledge Deficit - Standard  Goal: Patient and family/care givers will demonstrate understanding of plan of care, disease process/condition, diagnostic tests and medications  Outcome: Progressing  Note: Pt verbalizes understanding of plan of care and has no questions at this time.     Problem: Seizure Precautions  Goal: Implementation of seizure precautions  Outcome: Progressing  Note: Seizure precautions in place

## 2021-11-07 NOTE — DISCHARGE INSTRUCTIONS
Discharge Instructions    Discharged to home by car with relative. Discharged via wheelchair, hospital escort: Yes.  Special equipment needed: Not Applicable    Be sure to schedule a follow-up appointment with your primary care doctor or any specialists as instructed.     Discharge Plan:        I understand that a diet low in cholesterol, fat, and sodium is recommended for good health. Unless I have been given specific instructions below for another diet, I accept this instruction as my diet prescription.   Other diet: Cardiac diet    Special Instructions: Return to hospital for any chest pain    · Is patient discharged on Warfarin / Coumadin?   No     Depression / Suicide Risk    As you are discharged from this [facility] facility, it is important to learn how to keep safe from harming yourself.    Recognize the warning signs:  · Abrupt changes in personality, positive or negative- including increase in energy   · Giving away possessions  · Change in eating patterns- significant weight changes-  positive or negative  · Change in sleeping patterns- unable to sleep or sleeping all the time   · Unwillingness or inability to communicate  · Depression  · Unusual sadness, discouragement and loneliness  · Talk of wanting to die  · Neglect of personal appearance   · Rebelliousness- reckless behavior  · Withdrawal from people/activities they love  · Confusion- inability to concentrate     If you or a loved one observes any of these behaviors or has concerns about self-harm, here's what you can do:  · Talk about it- your feelings and reasons for harming yourself  · Remove any means that you might use to hurt yourself (examples: pills, rope, extension cords, firearm)  · Get professional help from the community (Mental Health, Substance Abuse, psychological counseling)  · Do not be alone:Call your Safe Contact- someone whom you trust who will be there for you.  · Call your local CRISIS HOTLINE 845-7748 or  612.471.2887  · Call your local Children's Mobile Crisis Response Team [place] (143) 729-6049 or www.[employer]  · Call the toll free National Suicide Prevention Hotlines   · National Suicide Prevention Lifeline 288-455-RKHE (4181)  · National Fara Line Network 800-SUICIDE (886-9579)    Please continue taking aspirin 81 mg daily, metoprolol 50 mg twice daily, Plavix 75 mg daily and Lipitor 40 mg every night.  Please discontinue drinking alcohol, please discontinue smoking, and especially please stop using methamphetamines.  All this in conjunction can continue to worsen your heart and eventually lead to cardiac arrest or death.    Please follow-up with your primary care physician or cardiology to have your medications refilled.    Please take care and be well

## 2021-11-07 NOTE — CARE PLAN
Problem: Knowledge Deficit - Standard  Goal: Patient and family/care givers will demonstrate understanding of plan of care, disease process/condition, diagnostic tests and medications  Outcome: Met     Problem: Optimal Care for Alcohol Withdrawal  Goal: Optimal Care for the alcohol withdrawal patient  Outcome: Met     Problem: Seizure Precautions  Goal: Implementation of seizure precautions  Outcome: Met     Problem: Lifestyle Changes  Goal: Patient's ability to identify lifestyle changes and available resources to help reduce recurrence of condition will improve  Outcome: Met     Problem: Psychosocial  Goal: Patient's level of anxiety will decrease  Outcome: Met  Goal: Spiritual and cultural needs incorporated into hospitalization  Outcome: Met     Problem: Risk for Aspiration  Goal: Patient's risk for aspiration will be absent or decrease  Outcome: Met   The patient is Stable - Low risk of patient condition declining or worsening    Shift Goals  Clinical Goals: Pain management  Patient Goals: Pain management    Progress made toward(s) clinical / shift goals: Pt DC home,     Patient is not progressing towards the following goals:  Shows little interest in life style changes

## 2021-11-07 NOTE — CARE PLAN
Problem: Knowledge Deficit - Standard  Goal: Patient and family/care givers will demonstrate understanding of plan of care, disease process/condition, diagnostic tests and medications  Outcome: Progressing  Note: Education provided regarding disease process.     Problem: Pain - Standard  Goal: Alleviation of pain or a reduction in pain to the patient’s comfort goal  Outcome: Met  Flowsheets (Taken 11/6/2021 8962)  OB Pain Intervention: Medication - See MAR  Note: Pain managed with PRN pain medications   The patient is Stable - Low risk of patient condition declining or worsening    Shift Goals  Clinical Goals: Pain management  Patient Goals: Pain management    Progress made toward(s) clinical / shift goals: Pt denies chest pain    Patient is not progressing towards the following goals:

## 2021-11-07 NOTE — PROGRESS NOTES
Pt DChome with mom and  in stable condition. DC education provided regarding new medications and f/u appointments. Pt verbalized understanding. Pt choose to walk out for DC without staff

## 2021-11-07 NOTE — DISCHARGE SUMMARY
Discharge Summary    CHIEF COMPLAINT ON ADMISSION  Chief Complaint   Patient presents with   • Detox     From alcohol and last drink was yesterday. The patient reports nausea, HA, weakness, hot flashes.       Reason for Admission  Chest pain     Admission Date  11/4/2021    CODE STATUS  Full Code    HPI & HOSPITAL COURSE  This is a 36 year old female with PMHx of methamphetamine use, alcohol use disorder and chronic tobacco use disorder who was admitted on 11/4/2021 due to recurrent chest pain, noted to have NSTEMI    Of note patient was admitted day prior but she signed out AMA.  Unfortunately patient is homeless and continues to use methamphetamines, last use was 48 hours prior to readmission.    Labs significant for troponin peak of 1749, patient with active chest pain,EKG with diffuse ST elevation with evolving anterior lateral T wave abnormalities, started on heparin drip. ECHO with LVEF 60%, akinesis of apex, no valvular abnormalities.Cardiology was consulted, patient had a LHC 11/5/2021 noting single-vessel nonobstructive CAD with LVEF 50%. Patient started on aspirin, Plavix, beta-blocker, and statin therapy.    Stressed the importance of tobacco, alcohol and methamphetamine cessation to the patient.    Patient is to follow-up with cardiology within the week.    Therefore, she is discharged in good and stable condition to home with close outpatient follow-up.    The patient met 2-midnight criteria for an inpatient stay at the time of discharge.    Discharge Date  11/7/2021    FOLLOW UP ITEMS POST DISCHARGE  Primary care physician  Cardiology    DISCHARGE DIAGNOSES  Principal Problem:    ACS (acute coronary syndrome) (HCC) POA: Yes  Active Problems:    Methamphetamine abuse (HCC) POA: Yes    Alcohol abuse POA: Yes  Resolved Problems:    * No resolved hospital problems. *      FOLLOW UP  [address] [address] [phone]  Call  If you would like to  establish with a Primary Care Provider in [place], please call [facility]. Thank you.    Kris Faulkner M.D.  [address]  [address]  [facility] [id number] [phone]    In 1 week   please call the office and make an appointment      MEDICATIONS ON DISCHARGE     Medication List      START taking these medications      Instructions   aspirin 81 MG Chew chewable tablet  Commonly known as: ASA   Chew 1 Tablet every day for 90 days.  Dose: 81 mg     atorvastatin 40 MG Tabs  Commonly known as: LIPITOR   Take 1 Tablet by mouth every evening for 90 days.  Dose: 40 mg     clopidogrel 75 MG Tabs  Commonly known as: PLAVIX   Take 1 Tablet by mouth every day for 90 days.  Dose: 75 mg     ferrous sulfate 325 (65 Fe) MG EC tablet   Take 1 Tablet by mouth 3 times a day with meals for 30 days.  Dose: 325 mg     folic acid 1 MG Tabs  Commonly known as: FOLVITE   Take 1 Tablet by mouth every day for 90 days.  Dose: 1 mg     metoprolol tartrate 50 MG Tabs  Commonly known as: LOPRESSOR   Take 1 Tablet by mouth 2 times a day for 90 days.  Dose: 50 mg     multivitamin Tabs   Take 1 Tablet by mouth every day for 90 days.  Dose: 1 Tablet     thiamine 100 MG tablet  Commonly known as: THIAMINE   Take 1 Tablet by mouth every day for 90 days.  Dose: 100 mg        STOP taking these medications    LORazepam 1 MG Tabs  Commonly known as: ATIVAN            Allergies  Allergies   Allergen Reactions   • Bee Venom Unspecified     Unknown reaction     • Hydrocodone Unspecified     Unknown reaction         DIET  Orders Placed This Encounter   Procedures   • Diet Order Diet: Cardiac     Standing Status:   Standing     Number of Occurrences:   1     Order Specific Question:   Diet:     Answer:   Cardiac [6]       ACTIVITY  As tolerated.  Weight bearing as tolerated    CONSULTATIONS  Cardiology    PROCEDURES  Left heart catheterization    LABORATORY  Lab Results   Component Value Date    SODIUM 137 11/07/2021    POTASSIUM 3.4 (L)  11/07/2021    CHLORIDE 102 11/07/2021    CO2 23 11/07/2021    GLUCOSE 99 11/07/2021    BUN 11 11/07/2021    CREATININE 0.44 (L) 11/07/2021        Lab Results   Component Value Date    WBC 4.6 (L) 11/06/2021    HEMOGLOBIN 8.5 (L) 11/06/2021    HEMATOCRIT 28.3 (L) 11/06/2021    PLATELETCT 210 11/06/2021      EC-ECHOCARDIOGRAM COMPLETE W/O CONT   Final Result      DX-CHEST-PORTABLE (1 VIEW)   Final Result      1.  No acute cardiopulmonary process is seen.   2.  Probable exostosis arising from the anterior third rib on the right near the costochondral junction. Correlation with prior imaging would be of value. Further delineation can be performed with CT.      CL-LEFT HEART CATHETERIZATION WITH POSSIBLE INTERVENTION    (Results Pending)     Total time of the discharge process exceeds 45 minutes.

## 2021-11-08 NOTE — ED NOTES
Pts SO requesting water and juice for himself as he is diabetic per pt, pt reminded she is not allowed to eat or drink anything yet

## 2021-11-08 NOTE — ED NOTES
Lab called with critical result of troponin 1145 at 2203. Critical lab result read back to lab.   Dr. Coronado notified of critical lab result at 2203.  Critical lab result read back by Dr. Coronado.

## 2021-11-08 NOTE — ED PROVIDER NOTES
"ER Provider Note     Scribed for Mello Coronado M.D. by Pretty Deng. 11/7/2021, 8:35 PM.    Primary Care Provider: Pcp Pt States None  Means of Arrival: walk-in   History obtained from: Patient  History limited by: None     CHIEF COMPLAINT  Chief Complaint   Patient presents with    Chest Pain     since this morning    Dizziness     since this morning    Back Pain     since this morning       HPI  Jerica Espinal is a 36 y.o. female who presents to the Emergency Department for evaluation of moderate to severe chest pain that radiates to her back onset this morning. The patient notes that this morning she was just discharge after being admitting for three days. She states that last night she had heart surgery for a blood clot. She spent most of the days sleeping and when she woke up to go to [employer] she felt as if she was going to pass out. She was extremely fatigue and her back and chest pain was worsening.Jerica notes that she has been having back pain for the past two days but her doctors initially believed it was due to being bed bound or a possible UTI. Her back pain is in the middle and lower parts of her back and states \"my back is on fire\". She also notes that she has chest pain that radiates to her back mid back. She describes her chest pain as \"it feels like someone is squeezing it, and suffocating it\" and she has associated shortness of breath. Her left shoulder and left hand are numb. She admits to associated symptoms of chills, but denies dysuria, or pelvic pain. No alleviating or exacerbating  factors were reported. Jerica recently had a miscarriage two weeks ago.She notes that she was sober from methamphetamine and alcohol and recently relapsed about 2 months. She notes that she is now 6 days clean. She denies any health problems. She has received her first dose of her COVID vaccination.     PPE Note: I personally donned full PPE for all patient encounters during this visit, including being " clean-shaven with an N95 respirator mask, gloves, gown, and goggles.  Scribe remained outside the patient's room and did not have any contact with the patient for the duration of patient encounter.     REVIEW OF SYSTEMS  Pertinent positives include chest pain, back pain, dizziness, shortness of breath, chills. Pertinent negatives include no dysuria or pelvic pain.    See HPI for further details. All other systems are negative.     PAST MEDICAL HISTORY   has a past medical history of Hypertensive emergency (11/4/2021), NSTEMI (non-ST elevated myocardial infarction) (HCC) (11/4/2021), and Pre-eclampsia added to pre-existing hypertension.    SURGICAL HISTORY   has a past surgical history that includes [id number].    SOCIAL HISTORY  Social History     Tobacco Use    Smoking status: Current Every Day Smoker     Types: Cigarettes    Smokeless tobacco: Never Used    Tobacco comment: 4 cigarettes per day   Vaping Use    Vaping Use: Never used   Substance Use Topics    Alcohol use: Yes     Comment: pint of vodka per day    Drug use: Not Currently      Social History     Substance and Sexual Activity   Drug Use Not Currently       FAMILY HISTORY  History reviewed. No pertinent family history.    CURRENT MEDICATIONS  Home Medications       Reviewed by Markel Marte R.N. (Registered Nurse) on 11/07/21 at 2012  Med List Status: Not Addressed     Medication Last Dose Status   aspirin (ASA) 81 MG Chew Tab chewable tablet  Active   atorvastatin (LIPITOR) 40 MG Tab  Active   clopidogrel (PLAVIX) 75 MG Tab  Active   ferrous sulfate 325 (65 Fe) MG EC tablet  Active   folic acid (FOLVITE) 1 MG Tab  Active   metoprolol tartrate (LOPRESSOR) 50 MG Tab  Active   multivitamin (THERAGRAN) Tab  Active   thiamine (THIAMINE) 100 MG tablet  Active                    ALLERGIES  Allergies   Allergen Reactions    Bee Venom Unspecified     Unknown reaction      Hydrocodone Unspecified     Unknown reaction         PHYSICAL EXAM  VITAL SIGNS: BP  "120/83   Pulse (!) 123   Temp 36.1 °C (97 °F) (Temporal)   Resp (!) 26   Ht 1.727 m (5' 8\")   Wt 72.6 kg (160 lb)   LMP 2021 (Exact Date)   SpO2 98%   BMI 24.33 kg/m²    \  Constitutional: Alert in no apparent distress.  HENT: No signs of trauma, Bilateral external ears normal, Nose normal.   Eyes: Pupils are equal and reactive, Conjunctiva normal, Non-icteric.   Neck: Normal range of motion, No tenderness, Supple, No stridor.   Lymphatic: No lymphadenopathy noted.   Cardiovascular: Tachycardiac and rhythm, no palpable thrill  Thorax & Lungs: No respiratory distress but unable to take deep breath,  No chest tenderness.  CTAB  Abdomen: Bowel sounds normal, Soft, No tenderness, No masses, No pulsatile masses. No peritoneal signs.  Skin: Warm, Dry, No erythema, No rash.   Back: No bony tenderness, No CVA tenderness.  Right-sided paraspinal tenderness  Extremities: Intact distal pulses, No edema, No tenderness, No cyanosis.  Musculoskeletal: Good range of motion in all major joints. No tenderness to palpation or major deformities noted.   Neurologic: Alert , Normal motor function, Normal sensory function, No focal deficits noted.  Psychiatric: Affect normal, Judgment normal, Mood normal.    DIAGNOSTIC STUDIES / PROCEDURES    EKG Interpretation:  Interpreted by me  12 Lead EKG interpreted by me to show:  Normal sinus rhythm  Rate 124  Axis: Normal  Intervals: Normal  Diffuse T waves inversion intra fibulation vs clutter.  Normal ST segments  My impression of this EKG: Unlike prior EKG. Does not indicate ischemia or arrhythmia at this time.     Results for orders placed or performed during the hospital encounter of 21   EKG   Result Value Ref Range    Report       [facility]    Test Date:  2021  Pt Name:    PRIYANKA GRANDE                  Department: ER  MRN:        [id number]                      Room:  Gender:     Female                       Technician: JEFF  :  "       1985                   Requested By:ER TRIAGE PROTOCOL  Order #:    [id number]                    Reading MD:    Measurements  Intervals                                Axis  Rate:       124                          P:  KS:                                      QRS:        101  QRSD:       110                          T:          4  QT:         328  QTc:        471    Interpretive Statements  A-FLUTTER/FIBRILLATION W/ COMPLETE AV BLOCK  INCOMPLETE RIGHT BUNDLE BRANCH BLOCK  Compared to ECG 11/05/2021 21:15:28  AV block, complete (third-degree) now present  Incomplete right bundle-branch block now present  Sinus rhythm no longer present  ST (T wave) deviation no longer present         LABS  Labs Reviewed   CBC WITH DIFFERENTIAL - Abnormal; Notable for the following components:       Result Value    RBC 3.66 (*)     Hemoglobin 8.8 (*)     Hematocrit 29.0 (*)     MCV 79.2 (*)     MCH 24.0 (*)     MCHC 30.3 (*)     Lymphocytes 18.40 (*)     Monocytes 23.80 (*)     Lymphs (Absolute) 0.93 (*)     Monos (Absolute) 1.20 (*)     All other components within normal limits   COMP METABOLIC PANEL - Abnormal; Notable for the following components:    Sodium 133 (*)     Potassium 3.5 (*)     Bun 6 (*)     Creatinine 0.48 (*)     Globulin 3.7 (*)     All other components within normal limits   TROPONIN - Abnormal; Notable for the following components:    Troponin T 1145 (*)     All other components within normal limits   HCG QUAL SERUM   ESTIMATED GFR     All labs reviewed by me.    RADIOLOGY  CT-CTA CHEST PULMONARY ARTERY W/ RECONS   Final Result      No pulmonary embolus identified.            DX-CHEST-PORTABLE (1 VIEW)   Final Result      No evidence of acute cardiopulmonary process.         The radiologist's interpretation of all radiological studies have been reviewed by me.    COURSE & MEDICAL DECISION MAKING  Pertinent Labs & Imaging studies reviewed. (See chart for details)    This is a 36 y.o. female that presents  with atypical chest pain. the patient was recently found to have an elevated troponin and a negative catheterization.  This was all felt to be related to the patient's drug use.  I will get a screening troponin as well as evaluate the patient with a chest x-ray and then evaluate her for pulmonary embolism.  The patient also does mention some back pain.  There is no midline tenderness and there is some paraspinal tenderness.  There are no red flags at this time other than the patient does have a history of amphetamine use.  Do not believe the spinal epidural abscess.  I do believe this is musculoskeletal.  I will have her follow-up with anything gets worse or she spikes a fever.    8:35 PM - Patient seen and examined at bedside. Ordered an EKG, DX-Chest, CBC with diff, CMP, Troponin. I informed the patient of my plan to run diagnostic studies to evaluate their symptoms. Patient verbalizes understanding and support with my plan of care.     9:06 PM - Ordered CT-CTA Chest Pulmonary Artery.     10:20 PM Cardiology Paged.    10:27 PM I discussed the patient's case and the above findings with Dr. Downey (Cardiology) who reccomends discharge.    The patient will return for new or worsening symptoms and is stable at the time of discharge.    The patient is referred to a primary physician for blood pressure management, diabetic screening, and for all other preventative health concerns.    DISPOSITION:  Patient will be discharged home in stable condition.    Patient has no PE.  The patient has a negative chest x-ray.  The patient's troponin is downtrending.  The patient has no significant electrolyte derangements.  The patient is anemic however this is baseline.  I spoke with cardiology and will have her follow-up with them.  She is well-appearing at this time.  I will have the  make an appointment for the patient as well.    FOLLOW UP:  Cardiology    FINAL IMPRESSION  1. Chest pain, unspecified type          I, Pretty  Ish (Scribrebeka), am scribing for, and in the presence of, Mello Coronado M.D..    Electronically signed by: Pretty Deng (James), 11/7/2021    IMello M.D. personally performed the services described in this documentation, as scribed by Pretty Deng in my presence, and it is both accurate and complete.     The note accurately reflects work and decisions made by me.  Mello Coronado M.D.  11/8/2021  1:23 AM

## 2021-11-08 NOTE — ED TRIAGE NOTES
Chief Complaint   Patient presents with   • Chest Pain     since this morning   • Dizziness     since this morning   • Back Pain     since this morning     Pt ambulatory to triage for above complaints. Pt recently discharged from inpatient this morning after being admitted for an MI and receiving a cardiac cath. Pt reports ongoing chest and back pain. History of alcoholism and meth abuse. Denies meth and alcohol use today. EKG done in triage.

## 2021-11-09 ENCOUNTER — HOSPITAL ENCOUNTER (OUTPATIENT)
Dept: RADIOLOGY | Facility: MEDICAL CENTER | Age: 36
End: 2021-11-09
Payer: COMMERCIAL

## 2021-11-23 ENCOUNTER — HOSPITAL ENCOUNTER (EMERGENCY)
Facility: MEDICAL CENTER | Age: 36
End: 2021-11-23
Attending: EMERGENCY MEDICINE
Payer: COMMERCIAL

## 2021-11-23 VITALS
WEIGHT: 157 LBS | RESPIRATION RATE: 16 BRPM | HEIGHT: 62 IN | OXYGEN SATURATION: 99 % | DIASTOLIC BLOOD PRESSURE: 65 MMHG | HEART RATE: 84 BPM | TEMPERATURE: 98.1 F | BODY MASS INDEX: 28.89 KG/M2 | SYSTOLIC BLOOD PRESSURE: 128 MMHG

## 2021-11-23 DIAGNOSIS — K04.02 IRREVERSIBLE PULPITIS: ICD-10-CM

## 2021-11-23 DIAGNOSIS — K04.7 DENTAL INFECTION: ICD-10-CM

## 2021-11-23 DIAGNOSIS — K04.7 DENTAL ABSCESS: ICD-10-CM

## 2021-11-23 PROCEDURE — 99283 EMERGENCY DEPT VISIT LOW MDM: CPT

## 2021-11-23 PROCEDURE — A9270 NON-COVERED ITEM OR SERVICE: HCPCS | Performed by: EMERGENCY MEDICINE

## 2021-11-23 PROCEDURE — 700102 HCHG RX REV CODE 250 W/ 637 OVERRIDE(OP): Performed by: EMERGENCY MEDICINE

## 2021-11-23 RX ORDER — PENICILLIN V POTASSIUM 500 MG/1
500 TABLET ORAL
Qty: 40 TABLET | Refills: 0 | Status: SHIPPED | OUTPATIENT
Start: 2021-11-23

## 2021-11-23 RX ORDER — PENICILLIN V POTASSIUM 500 MG/1
500 TABLET ORAL ONCE
Status: COMPLETED | OUTPATIENT
Start: 2021-11-23 | End: 2021-11-23

## 2021-11-23 RX ORDER — OXYCODONE HYDROCHLORIDE AND ACETAMINOPHEN 5; 325 MG/1; MG/1
1 TABLET ORAL ONCE
Status: COMPLETED | OUTPATIENT
Start: 2021-11-23 | End: 2021-11-23

## 2021-11-23 RX ADMIN — OXYCODONE HYDROCHLORIDE AND ACETAMINOPHEN 1 TABLET: 5; 325 TABLET ORAL at 17:52

## 2021-11-23 RX ADMIN — PENICILLIN V POTASSIUM 500 MG: 500 TABLET, FILM COATED ORAL at 17:52

## 2021-11-23 ASSESSMENT — FIBROSIS 4 INDEX: FIB4 SCORE: 0.79

## 2021-11-24 NOTE — ED PROVIDER NOTES
"ED Provider Note            Primary care provider: Pcp Pt States None    I verified that the patient was wearing a mask and I was wearing appropriate PPE every time I entered the room. The patient's mask was on the patient at all times during my encounter except for a brief view of the oropharynx.      CHIEF COMPLAINT  Chief Complaint   Patient presents with   • Facial Swelling     pt reports waking up 2 days ago with an ear ache, now presents with L side face swollen, ringing in the ear. \"It spread like a chain fence, from ear to tooth to jaw\". Reports swelling has decreased throughout the day. Tylenol PM last taken last night. Ibuprofen x2 last at 1030am.        HPI  Jerica Espinal is a 36 y.o. female who presents to the Emergency Department with chief complaint of left-sided facial pain. Patient states that over the last 2 days the pain has seemed to travel in between her ear and her left jaw. Minor chills no measured fever minor headache in this area no difficulty swallowing or breathing she has had some minor swelling over the left side of her face no drainage into the mouth no cough congestion chest pain shortness of breath abdominal pain no urinary issues no stooling issues no other acute symptoms or concerns. Pain severe worse with mastication or manipulation of the area no alleviating factors.    REVIEW OF SYSTEMS  10 systems reviewed and otherwise negative, pertinent positives and negatives listed in the history of present illness.    PAST MEDICAL HISTORY   has a past medical history of Hypertensive emergency (11/4/2021), NSTEMI (non-ST elevated myocardial infarction) (HCC) (11/4/2021), and Pre-eclampsia added to pre-existing hypertension.    SURGICAL HISTORY   has a past surgical history that includes [id number].    SOCIAL HISTORY  Social History     Tobacco Use   • Smoking status: Current Every Day Smoker     Types: Cigars   • Smokeless tobacco: Never Used   • Tobacco comment: 4 black and milds per day " "  Vaping Use   • Vaping Use: Never used   Substance Use Topics   • Alcohol use: Not Currently     Comment: pint of vodka per day   • Drug use: Yes     Types: Inhaled     Comment: Marijuana      Social History     Substance and Sexual Activity   Drug Use Yes   • Types: Inhaled    Comment: Marijuana       FAMILY HISTORY  Non-Contributory    CURRENT MEDICATIONS  Home Medications     Reviewed by Shahab Sanchez R.N. (Registered Nurse) on 11/23/21 at 1656  Med List Status: Not Addressed   Medication Last Dose Status   aspirin (ASA) 81 MG Chew Tab chewable tablet  Active   atorvastatin (LIPITOR) 40 MG Tab  Active   clopidogrel (PLAVIX) 75 MG Tab  Active   ferrous sulfate 325 (65 Fe) MG EC tablet  Active   folic acid (FOLVITE) 1 MG Tab  Active   metoprolol tartrate (LOPRESSOR) 50 MG Tab  Active   multivitamin (THERAGRAN) Tab  Active   thiamine (THIAMINE) 100 MG tablet  Active                ALLERGIES  Allergies   Allergen Reactions   • Bee Venom Unspecified     Unknown reaction     • Hydrocodone Unspecified     Unknown reaction         PHYSICAL EXAM  VITAL SIGNS: /77   Pulse 95   Temp 36.2 °C (97.2 °F) (Temporal)   Resp 16   Ht 1.575 m (5' 2\")   Wt 71.2 kg (157 lb)   LMP 10/03/2021 (Approximate)   SpO2 98%   BMI 28.72 kg/m²   Pulse ox interpretation: I interpret this pulse ox as normal.  Constitutional: Alert and oriented x 3, minimal distress  HEENT: Atraumatic normocephalic, pupils are equal round, extraocular movements are intact. The nares is clear, external ears are normal, mouth shows moist mucous membranes poor dentition irreversible pulpitis of tooth #18 with surrounding periodontal erythema and edema no fluctuance no drainage no submental fullness tympanic membranes are unremarkable.  Neck: no obvious JVD or tracheal deviation  Cardiovascular: Regular rate and rhythm no murmur rub or gallop   Thorax & Lungs: No respiratory distress, no wheezes rales or rhonchi, No chest tenderness.   GI: Soft " "nontender nondistended positive bowel sounds, no peritoneal signs  Skin: Warm dry no obvious acute rash or lesion  Musculoskeletal: Moving all extremities with normal range strength, no acute  deformity  Neurologic: Cranial nerves III through XII are grossly intact, no sensory deficit, no cerebellar dysfunction   Psychiatric: Appropriate affect for situation at this time      DIAGNOSTIC STUDIES / PROCEDURES  LABS        Medical Decision Making: No submental fullness no difficulty swallowing or breathing no fluctuance or active drainage at this time patient does have evidence however of irreversible pulpitis and likely apical abscess and tooth 18 given Norco and penicillin here discharged with prescription for penicillin and instructions to follow-up with dentistry at the next available time discharged in stable and improved condition    /77   Pulse 95   Temp 36.2 °C (97.2 °F) (Temporal)   Resp 16   Ht 1.575 m (5' 2\")   Wt 71.2 kg (157 lb)   LMP 10/03/2021 (Approximate)   SpO2 98%   BMI 28.72 kg/m²     Dentistry  Next available time  Schedule an appointment as soon as possible for a visit   Next available time      Discharge Medication List as of 11/23/2021  5:52 PM      START taking these medications    Details   penicillin v potassium (VEETID) 500 MG Tab Take 1 Tablet by mouth 4 Times a Day,Before Meals and at Bedtime., Disp-40 Tablet, R-0, Normal             FINAL IMPRESSION  1. Irreversible pulpitis Active   2. Dental infection Active   3. Dental abscess Active          This dictation has been created using voice recognition software and/or scribes. The accuracy of the dictation is limited by the abilities of the software and the expertise of the scribes. I expect there may be some errors of grammar and possibly content. I made every attempt to manually correct the errors within my dictation. However, errors related to voice recognition software and/or scribes may still exist and should be " interpreted within the appropriate context.

## 2021-11-24 NOTE — ED TRIAGE NOTES
"Jerica Espinal  36 y.o. female  Chief Complaint   Patient presents with   • Facial Swelling     pt reports waking up 2 days ago with an ear ache, now presents with L side face swollen, ringing in the ear. \"It spread like a chain fence, from ear to tooth to jaw\". Reports swelling has decreased throughout the day. Tylenol PM last taken last night. Ibuprofen x2 last at 1030am.        Vitals:    11/23/21 1630   BP: 122/77   Pulse: 95   Resp: 16   Temp: 36.2 °C (97.2 °F)   SpO2: 98%        Patient ambulatory into triage for the complaint above of facial swelling - pt reports waking up x2 days ago with an ear ache and the pain has spread down to the tooth and jaw line, also increase in swelling. In triage swelling not as apparent, reports taking tylenol and ibuprofen. Pt does indicate that the L ear is ringing. Denies any nausea or vomiting. Pt is A/Ox4.    Patient is in stable condition at time of triage, has been educated on the triage process and placed back into the ED lobby at this time - pt has verbalized understanding of this process.     RN encouraged patient to alert staff at front for any changes that may occur while they are waiting to be evaluated by an ERP.     Of note, this RN and patient are in proper PPE and has a mask in place at all times during this encounter.     Past Medical History:   Diagnosis Date   • Hypertensive emergency 11/4/2021   • NSTEMI (non-ST elevated myocardial infarction) (HCC) 11/4/2021   • Pre-eclampsia added to pre-existing hypertension         "

## 2021-11-24 NOTE — ED NOTES
Discharge instructions given to pt. Prescriptions sent to pt's pharmacy. Pt educated, verbalizes understanding. All belongings accounted for. Pt ambulated out of ED with steady gait to go home with friend at side.

## 2021-12-02 ENCOUNTER — APPOINTMENT (OUTPATIENT)
Dept: RADIOLOGY | Facility: MEDICAL CENTER | Age: 36
End: 2021-12-02
Attending: EMERGENCY MEDICINE
Payer: COMMERCIAL

## 2021-12-02 ENCOUNTER — HOSPITAL ENCOUNTER (EMERGENCY)
Facility: MEDICAL CENTER | Age: 36
End: 2021-12-03
Attending: EMERGENCY MEDICINE
Payer: COMMERCIAL

## 2021-12-02 DIAGNOSIS — S02.2XXA CLOSED FRACTURE OF NASAL BONE, INITIAL ENCOUNTER: ICD-10-CM

## 2021-12-02 DIAGNOSIS — G44.319 ACUTE POST-TRAUMATIC HEADACHE, NOT INTRACTABLE: ICD-10-CM

## 2021-12-02 DIAGNOSIS — R07.89 CHEST WALL PAIN: ICD-10-CM

## 2021-12-02 DIAGNOSIS — S09.90XA CLOSED HEAD INJURY, INITIAL ENCOUNTER: ICD-10-CM

## 2021-12-02 DIAGNOSIS — S22.43XA CLOSED FRACTURE OF MULTIPLE RIBS OF BOTH SIDES, INITIAL ENCOUNTER: ICD-10-CM

## 2021-12-02 DIAGNOSIS — R10.84 GENERALIZED ABDOMINAL PAIN: ICD-10-CM

## 2021-12-02 DIAGNOSIS — S00.81XA ABRASION OF FACE, INITIAL ENCOUNTER: ICD-10-CM
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
## 2021-12-02 LAB
ALBUMIN SERPL BCP-MCNC: 4.3 G/DL (ref 3.2–4.9)
ALBUMIN/GLOB SERPL: 1.5 G/DL
ALP SERPL-CCNC: 89 U/L (ref 30–99)
ALT SERPL-CCNC: 43 U/L (ref 2–50)
ANION GAP SERPL CALC-SCNC: 13 MMOL/L (ref 7–16)
ANISOCYTOSIS BLD QL SMEAR: ABNORMAL
APTT PPP: 26.5 SEC (ref 24.7–36)
AST SERPL-CCNC: 77 U/L (ref 12–45)
BASOPHILS # BLD AUTO: 0 % (ref 0–1.8)
BASOPHILS # BLD: 0 K/UL (ref 0–0.12)
BILIRUB SERPL-MCNC: <0.2 MG/DL (ref 0.1–1.5)
BUN SERPL-MCNC: 13 MG/DL (ref 8–22)
CALCIUM SERPL-MCNC: 9.2 MG/DL (ref 8.5–10.5)
CHLORIDE SERPL-SCNC: 110 MMOL/L (ref 96–112)
CO2 SERPL-SCNC: 21 MMOL/L (ref 20–33)
COMMENT 1642: NORMAL
CREAT SERPL-MCNC: 0.64 MG/DL (ref 0.5–1.4)
EOSINOPHIL # BLD AUTO: 0.01 K/UL (ref 0–0.51)
EOSINOPHIL NFR BLD: 0.2 % (ref 0–6.9)
ETHANOL BLD-MCNC: 158.7 MG/DL (ref 0–10)
GLOBULIN SER CALC-MCNC: 2.9 G/DL (ref 1.9–3.5)
GLUCOSE SERPL-MCNC: 91 MG/DL (ref 65–99)
HCG SERPL QL: NEGATIVE
HCT VFR BLD AUTO: 42.8 % (ref 37–47)
HGB BLD-MCNC: 13.4 G/DL (ref 12–16)
IMM GRANULOCYTES # BLD AUTO: 0.03 K/UL (ref 0–0.11)
IMM GRANULOCYTES NFR BLD AUTO: 0.5 % (ref 0–0.9)
INR PPP: 0.96 (ref 0.87–1.13)
LYMPHOCYTES # BLD AUTO: 1.01 K/UL (ref 1–4.8)
LYMPHOCYTES NFR BLD: 18.4 % (ref 22–41)
MACROCYTES BLD QL SMEAR: ABNORMAL
MCH RBC QN AUTO: 28.5 PG (ref 27–33)
MCHC RBC AUTO-ENTMCNC: 31.3 G/DL (ref 33.6–35)
MCV RBC AUTO: 91.1 FL (ref 81.4–97.8)
MICROCYTES BLD QL SMEAR: ABNORMAL
MONOCYTES # BLD AUTO: 0.42 K/UL (ref 0–0.85)
MONOCYTES NFR BLD AUTO: 7.7 % (ref 0–13.4)
MORPHOLOGY BLD-IMP: NORMAL
NEUTROPHILS # BLD AUTO: 4.02 K/UL (ref 2–7.15)
NEUTROPHILS NFR BLD: 73.2 % (ref 44–72)
NRBC # BLD AUTO: 0 K/UL
NRBC BLD-RTO: 0 /100 WBC
OVALOCYTES BLD QL SMEAR: NORMAL
PLATELET # BLD AUTO: 264 K/UL (ref 164–446)
PLATELET BLD QL SMEAR: NORMAL
PMV BLD AUTO: 9.7 FL (ref 9–12.9)
POIKILOCYTOSIS BLD QL SMEAR: NORMAL
POTASSIUM SERPL-SCNC: 3.9 MMOL/L (ref 3.6–5.5)
PROT SERPL-MCNC: 7.2 G/DL (ref 6–8.2)
PROTHROMBIN TIME: 12.5 SEC (ref 12–14.6)
RBC # BLD AUTO: 4.7 M/UL (ref 4.2–5.4)
RBC BLD AUTO: PRESENT
SODIUM SERPL-SCNC: 144 MMOL/L (ref 135–145)
WBC # BLD AUTO: 5.5 K/UL (ref 4.8–10.8)

## 2021-12-02 PROCEDURE — 71045 X-RAY EXAM CHEST 1 VIEW: CPT

## 2021-12-02 PROCEDURE — 84703 CHORIONIC GONADOTROPIN ASSAY: CPT

## 2021-12-02 PROCEDURE — 85347 COAGULATION TIME ACTIVATED: CPT

## 2021-12-02 PROCEDURE — 85730 THROMBOPLASTIN TIME PARTIAL: CPT

## 2021-12-02 PROCEDURE — 85610 PROTHROMBIN TIME: CPT

## 2021-12-02 PROCEDURE — 36415 COLL VENOUS BLD VENIPUNCTURE: CPT

## 2021-12-02 PROCEDURE — 99285 EMERGENCY DEPT VISIT HI MDM: CPT

## 2021-12-02 PROCEDURE — 90471 IMMUNIZATION ADMIN: CPT

## 2021-12-02 PROCEDURE — 700111 HCHG RX REV CODE 636 W/ 250 OVERRIDE (IP): Performed by: EMERGENCY MEDICINE

## 2021-12-02 PROCEDURE — 96374 THER/PROPH/DIAG INJ IV PUSH: CPT

## 2021-12-02 PROCEDURE — 85384 FIBRINOGEN ACTIVITY: CPT

## 2021-12-02 PROCEDURE — 80053 COMPREHEN METABOLIC PANEL: CPT

## 2021-12-02 PROCEDURE — 90715 TDAP VACCINE 7 YRS/> IM: CPT | Performed by: EMERGENCY MEDICINE

## 2021-12-02 PROCEDURE — 82077 ASSAY SPEC XCP UR&BREATH IA: CPT

## 2021-12-02 PROCEDURE — 96375 TX/PRO/DX INJ NEW DRUG ADDON: CPT

## 2021-12-02 PROCEDURE — 700105 HCHG RX REV CODE 258: Performed by: EMERGENCY MEDICINE

## 2021-12-02 PROCEDURE — 85025 COMPLETE CBC W/AUTO DIFF WBC: CPT

## 2021-12-02 PROCEDURE — 85576 BLOOD PLATELET AGGREGATION: CPT

## 2021-12-02 RX ORDER — ONDANSETRON 2 MG/ML
4 INJECTION INTRAMUSCULAR; INTRAVENOUS ONCE
Status: COMPLETED | OUTPATIENT
Start: 2021-12-02 | End: 2021-12-02

## 2021-12-02 RX ORDER — MORPHINE SULFATE 4 MG/ML
4 INJECTION INTRAVENOUS ONCE
Status: COMPLETED | OUTPATIENT
Start: 2021-12-02 | End: 2021-12-02

## 2021-12-02 RX ORDER — SODIUM CHLORIDE 9 MG/ML
1000 INJECTION, SOLUTION INTRAVENOUS ONCE
Status: COMPLETED | OUTPATIENT
Start: 2021-12-02 | End: 2021-12-03

## 2021-12-02 RX ADMIN — CLOSTRIDIUM TETANI TOXOID ANTIGEN (FORMALDEHYDE INACTIVATED), CORYNEBACTERIUM DIPHTHERIAE TOXOID ANTIGEN (FORMALDEHYDE INACTIVATED), BORDETELLA PERTUSSIS TOXOID ANTIGEN (GLUTARALDEHYDE INACTIVATED), BORDETELLA PERTUSSIS FILAMENTOUS HEMAGGLUTININ ANTIGEN (FORMALDEHYDE INACTIVATED), BORDETELLA PERTUSSIS PERTACTIN ANTIGEN, AND BORDETELLA PERTUSSIS FIMBRIAE 2/3 ANTIGEN 0.5 ML: 5; 2; 2.5; 5; 3; 5 INJECTION, SUSPENSION INTRAMUSCULAR at 22:45

## 2021-12-02 RX ADMIN — MORPHINE SULFATE 4 MG: 4 INJECTION INTRAVENOUS at 22:45

## 2021-12-02 RX ADMIN — ONDANSETRON 4 MG: 2 INJECTION INTRAMUSCULAR; INTRAVENOUS at 22:45

## 2021-12-02 RX ADMIN — SODIUM CHLORIDE 1000 ML: 9 INJECTION, SOLUTION INTRAVENOUS at 23:09

## 2021-12-02 ASSESSMENT — FIBROSIS 4 INDEX: FIB4 SCORE: 0.79

## 2021-12-03 VITALS
TEMPERATURE: 98.6 F | WEIGHT: 157 LBS | BODY MASS INDEX: 23.79 KG/M2 | SYSTOLIC BLOOD PRESSURE: 121 MMHG | OXYGEN SATURATION: 97 % | HEART RATE: 84 BPM | RESPIRATION RATE: 20 BRPM | HEIGHT: 68 IN | DIASTOLIC BLOOD PRESSURE: 68 MMHG

## 2021-12-03 LAB
CFT BLD TEG: 4.1 MIN (ref 4.6–9.1)
CFT P HPASE BLD TEG: 3.6 MIN (ref 4.3–8.3)
CLOT ANGLE BLD TEG: 76.6 DEGREES (ref 63–78)
CT.EXTRINSIC BLD ROTEM: 1 MIN (ref 0.8–2.1)
MCF BLD TEG: 61.6 MM (ref 52–69)
MCF.PLATELET INHIB BLD ROTEM: 23.4 MM (ref 15–32)
TEG ALGORITHM TGALG: ABNORMAL

## 2021-12-03 PROCEDURE — 700111 HCHG RX REV CODE 636 W/ 250 OVERRIDE (IP): Performed by: EMERGENCY MEDICINE

## 2021-12-03 PROCEDURE — 71260 CT THORAX DX C+: CPT

## 2021-12-03 PROCEDURE — 96375 TX/PRO/DX INJ NEW DRUG ADDON: CPT

## 2021-12-03 PROCEDURE — 72125 CT NECK SPINE W/O DYE: CPT

## 2021-12-03 PROCEDURE — 700117 HCHG RX CONTRAST REV CODE 255: Performed by: EMERGENCY MEDICINE

## 2021-12-03 PROCEDURE — 70450 CT HEAD/BRAIN W/O DYE: CPT

## 2021-12-03 PROCEDURE — A9270 NON-COVERED ITEM OR SERVICE: HCPCS | Performed by: EMERGENCY MEDICINE

## 2021-12-03 PROCEDURE — 700102 HCHG RX REV CODE 250 W/ 637 OVERRIDE(OP): Performed by: EMERGENCY MEDICINE

## 2021-12-03 PROCEDURE — 70486 CT MAXILLOFACIAL W/O DYE: CPT

## 2021-12-03 RX ORDER — NAPROXEN 500 MG/1
500 TABLET ORAL 2 TIMES DAILY WITH MEALS
Qty: 30 TABLET | Refills: 0 | Status: SHIPPED | OUTPATIENT
Start: 2021-12-03 | End: 2021-12-03 | Stop reason: SDUPTHER

## 2021-12-03 RX ORDER — METHOCARBAMOL 500 MG/1
1000 TABLET, FILM COATED ORAL ONCE
Status: COMPLETED | OUTPATIENT
Start: 2021-12-03 | End: 2021-12-03

## 2021-12-03 RX ORDER — METHOCARBAMOL 750 MG/1
750 TABLET, FILM COATED ORAL 3 TIMES DAILY
Qty: 30 TABLET | Refills: 0 | Status: SHIPPED | OUTPATIENT
Start: 2021-12-03 | End: 2021-12-13

## 2021-12-03 RX ORDER — METHOCARBAMOL 750 MG/1
750 TABLET, FILM COATED ORAL 3 TIMES DAILY
Qty: 30 TABLET | Refills: 0 | Status: SHIPPED | OUTPATIENT
Start: 2021-12-03 | End: 2021-12-03 | Stop reason: SDUPTHER

## 2021-12-03 RX ORDER — NAPROXEN 500 MG/1
500 TABLET ORAL 2 TIMES DAILY WITH MEALS
Qty: 30 TABLET | Refills: 0 | Status: SHIPPED | OUTPATIENT
Start: 2021-12-03 | End: 2021-12-18

## 2021-12-03 RX ORDER — LIDOCAINE 50 MG/G
1 PATCH TOPICAL EVERY 24 HOURS
Qty: 10 PATCH | Refills: 0 | Status: SHIPPED | OUTPATIENT
Start: 2021-12-03 | End: 2021-12-03 | Stop reason: SDUPTHER

## 2021-12-03 RX ORDER — LIDOCAINE 50 MG/G
1 PATCH TOPICAL EVERY 24 HOURS
Qty: 10 PATCH | Refills: 0 | Status: SHIPPED | OUTPATIENT
Start: 2021-12-03 | End: 2021-12-13

## 2021-12-03 RX ORDER — KETOROLAC TROMETHAMINE 30 MG/ML
15 INJECTION, SOLUTION INTRAMUSCULAR; INTRAVENOUS ONCE
Status: COMPLETED | OUTPATIENT
Start: 2021-12-03 | End: 2021-12-03

## 2021-12-03 RX ADMIN — KETOROLAC TROMETHAMINE 15 MG: 30 INJECTION, SOLUTION INTRAMUSCULAR; INTRAVENOUS at 01:06

## 2021-12-03 RX ADMIN — METHOCARBAMOL 1000 MG: 500 TABLET ORAL at 01:06

## 2021-12-03 RX ADMIN — IOHEXOL 100 ML: 350 INJECTION, SOLUTION INTRAVENOUS at 00:25
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
## 2021-12-03 NOTE — DISCHARGE INSTRUCTIONS
Left lateral seventh and ninth rib fractures. Right anterolateral seventh and eighth rib fractures.

## 2024-05-04 NOTE — ED NOTES
EKG completed on patient, signed by ERP ( Krishna)  Previous EKG provided for comparison    Please return to the ER or seek immediate medical attention if you experience new or worsening chest pain, shortness of breath, fever of 38C (100.4) or higher, persistent vomiting, weakness, numbness, tingling, excessive sweating,  loss of motion in your arms or legs, fainting, vision changes, or any new or worsening symptoms.    You are welcome back any time. Thank you for entrusting your care to us, I hope we made your visit as pleasant as possible. Wishing you well!    Dr. Rapp